# Patient Record
Sex: FEMALE | Race: WHITE | NOT HISPANIC OR LATINO | Employment: OTHER | URBAN - METROPOLITAN AREA
[De-identification: names, ages, dates, MRNs, and addresses within clinical notes are randomized per-mention and may not be internally consistent; named-entity substitution may affect disease eponyms.]

---

## 2017-05-05 ENCOUNTER — HOSPITAL ENCOUNTER (OUTPATIENT)
Dept: FAMILY MEDICINE CLINIC | Facility: CLINIC | Age: 62
Setting detail: SPECIMEN
Discharge: HOME | End: 2017-05-05
Attending: FAMILY MEDICINE | Admitting: FAMILY MEDICINE

## 2017-05-05 LAB
BACTERIA SPEC AEROBE CULT: NORMAL
Lab: NORMAL
MICRO REPORT STATUS: NORMAL
SPECIMEN SOURCE: NORMAL

## 2018-07-09 ENCOUNTER — HOSPITAL ENCOUNTER (OUTPATIENT)
Dept: FAMILY MEDICINE CLINIC | Facility: CLINIC | Age: 63
Setting detail: SPECIMEN
Discharge: HOME | End: 2018-07-09
Attending: FAMILY MEDICINE | Admitting: FAMILY MEDICINE

## 2018-07-09 LAB
ALBUMIN SERPL-MCNC: 3.6 G/DL (ref 3.5–4.8)
ALBUMIN/GLOB SERPL: 1.3 {RATIO} (ref 1–1.7)
ALP SERPL-CCNC: 93 IU/L (ref 32–91)
ALT SERPL-CCNC: 21 IU/L (ref 14–54)
ANION GAP SERPL CALC-SCNC: 10.4 MMOL/L (ref 10–20)
AST SERPL-CCNC: 23 IU/L (ref 15–41)
BILIRUB SERPL-MCNC: 0.5 MG/DL (ref 0.3–1.2)
BUN SERPL-MCNC: 14 MG/DL (ref 8–20)
BUN/CREAT SERPL: 15.6 (ref 5.4–26.2)
CALCIUM SERPL-MCNC: 9.3 MG/DL (ref 8.9–10.3)
CHLORIDE SERPL-SCNC: 108 MMOL/L (ref 101–111)
CHOLEST SERPL-MCNC: 145 MG/DL
CHOLEST/HDLC SERPL: 2.5 {RATIO}
CONV CO2: 26 MMOL/L (ref 22–32)
CONV LDL CHOLESTEROL DIRECT: 67 MG/DL (ref 0–100)
CONV TOTAL PROTEIN: 6.3 G/DL (ref 6.1–7.9)
CREAT UR-MCNC: 0.9 MG/DL (ref 0.4–1)
GLOBULIN UR ELPH-MCNC: 2.7 G/DL (ref 2.5–3.8)
GLUCOSE SERPL-MCNC: 90 MG/DL (ref 65–99)
HDLC SERPL-MCNC: 58 MG/DL
LDLC/HDLC SERPL: 1.2 {RATIO}
LIPID INTERPRETATION: NORMAL
POTASSIUM SERPL-SCNC: 4.4 MMOL/L (ref 3.6–5.1)
SODIUM SERPL-SCNC: 140 MMOL/L (ref 136–144)
TRIGL SERPL-MCNC: 104 MG/DL
VLDLC SERPL CALC-MCNC: 20.2 MG/DL

## 2019-07-15 ENCOUNTER — OFFICE VISIT (OUTPATIENT)
Dept: FAMILY MEDICINE CLINIC | Facility: CLINIC | Age: 64
End: 2019-07-15

## 2019-07-15 VITALS
TEMPERATURE: 98.6 F | DIASTOLIC BLOOD PRESSURE: 73 MMHG | SYSTOLIC BLOOD PRESSURE: 127 MMHG | HEART RATE: 69 BPM | OXYGEN SATURATION: 98 %

## 2019-07-15 DIAGNOSIS — D17.24 BENIGN LIPOMATOUS NEOPLASM OF SKIN AND SUBCUTANEOUS TISSUE OF LEFT LEG: Primary | ICD-10-CM

## 2019-07-15 PROBLEM — D72.810 LYMPHOCYTOPENIA: Status: ACTIVE | Noted: 2017-05-05

## 2019-07-15 PROCEDURE — 99213 OFFICE O/P EST LOW 20 MIN: CPT | Performed by: FAMILY MEDICINE

## 2019-07-15 RX ORDER — HYDROXYZINE 50 MG/1
50 TABLET, FILM COATED ORAL DAILY
COMMUNITY
Start: 2016-10-19 | End: 2020-10-27

## 2019-07-15 RX ORDER — BACLOFEN 20 MG/1
TABLET ORAL
COMMUNITY
Start: 2012-10-22 | End: 2020-10-27

## 2019-07-15 RX ORDER — NEBIVOLOL 20 MG/1
TABLET ORAL EVERY 24 HOURS
COMMUNITY
Start: 2017-12-14 | End: 2019-10-10 | Stop reason: SDUPTHER

## 2019-07-15 RX ORDER — ATORVASTATIN CALCIUM 20 MG/1
20 TABLET, FILM COATED ORAL DAILY
COMMUNITY
Start: 2016-04-18 | End: 2019-12-14 | Stop reason: SDUPTHER

## 2019-07-15 RX ORDER — CHOLECALCIFEROL (VITAMIN D3) 125 MCG
CAPSULE ORAL EVERY 24 HOURS
COMMUNITY
Start: 2018-07-09

## 2019-07-15 NOTE — ASSESSMENT & PLAN NOTE
No intervention needed at this time.  Continue observation.  If the area becomes painful or enlarged then refer to surgeon

## 2019-07-15 NOTE — PROGRESS NOTES
Subjective   Chief Complaint   Patient presents with   • Lower Extremity Issue     lump in left thigh x 1 month, no pain     Radha Ballard is a 64 y.o. female.     Noticed a lump in her left anterior thigh about a month ago.  No change since she originally noticed it.  Not painful.  No redness, no rash, no bruising.  No bleeding, no drainage.       Lower Extremity Issue   This is a new problem. The current episode started 1 to 4 weeks ago. The problem occurs constantly. Pertinent negatives include no abdominal pain, chest pain, chills, coughing, fever, rash, sore throat or swollen glands. Nothing aggravates the symptoms. She has tried nothing for the symptoms.      Past Medical History:   Diagnosis Date   • Encounter for Zostavax administration    • History of hepatitis A vaccination 2018   • History of lymphopenia     due ro Lemtrada   • Hypertension    • Meningitis 1969   • MS (mitral stenosis)     Dr Martinez, first symtoms at age 20   • Optic neuritis    • Sleep apnea 09/01/2016    Bi-pap machine   • Spinal stenosis      Past Surgical History:   Procedure Laterality Date   • BREAST BIOPSY Right 1998    benign   • CATARACT EXTRACTION, BILATERAL  03/2019   • HIP BIPOLAR REPLACEMENT Left 01/2014    Dr Stern   • LAMINOTOMY      L-3-L-4-L-5 Dr Menezes   • OTHER SURGICAL HISTORY      multiple SCLEROSIS plaque   • OTHER SURGICAL HISTORY      [facility], [place]   • SPINE SURGERY      minimally invasive spine surgery- Dr Torres     Allergies   Allergen Reactions   • Amlodipine Besylate Swelling   • Losartan Rash   • Lisinopril Cough     Social History     Socioeconomic History   • Marital status: Single     Spouse name: Not on file   • Number of children: Not on file   • Years of education: Not on file   • Highest education level: Not on file   Tobacco Use   • Smoking status: Light Tobacco Smoker     Years: 5.00     Types: Cigarettes   • Smokeless tobacco: Never Used   • Tobacco comment: quit at  age 28   Substance and Sexual Activity   • Alcohol use: Yes     Comment: socially     Social History     Tobacco Use   Smoking Status Light Tobacco Smoker   • Years: 5.00   • Types: Cigarettes   Smokeless Tobacco Never Used   Tobacco Comment    quit at age 28       family history includes Alzheimer's disease in her mother; COPD in her other; Cancer in her mother; Hypertension in her other; Other in her father.  Current Outpatient Medications on File Prior to Visit   Medication Sig Dispense Refill   • atorvastatin (LIPITOR) 20 MG tablet Take 20 mg by mouth Daily.     • baclofen (LIORESAL) 20 MG tablet TAKE 1 TABLET BY MOUTH FOUR TIMES DAILY     • Cholecalciferol (VITAMIN D3) 2000 units tablet Daily.     • hydrOXYzine (ATARAX) 50 MG tablet Take 50 mg by mouth Daily.     • nebivolol (BYSTOLIC) 20 MG tablet Daily.       No current facility-administered medications on file prior to visit.      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    Review of Systems   Constitutional: Negative for chills and fever.   HENT: Negative for sinus pressure and sore throat.    Eyes: Negative for blurred vision.   Respiratory: Negative for cough and shortness of breath.    Cardiovascular: Negative for chest pain and palpitations.   Gastrointestinal: Negative for abdominal pain.    Endocrine: Negative for polyuria.   Skin: Negative for rash.   Neurological: Negative for dizziness and headache.   Hematological: Negative for adenopathy.   Psychiatric/Behavioral: Negative for depressed mood.       Objective   /73 (BP Location: Left arm, Patient Position: Sitting, Cuff Size: Adult)   Pulse 69   Temp 98.6 °F (37 °C) (Oral)   SpO2 98%   Physical Exam   Constitutional: She appears well-developed and well-nourished.   In power chair   Cardiovascular: Normal rate.   Pulmonary/Chest: Effort normal.   Skin: Skin is warm and dry.   Left anterior proximal thigh with subcutaneous palpable mass approx 2cm.       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  Continue observation.  If the area becomes painful or enlarged then refer to surgeon                Radha was seen today for lower extremity issue.    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

## 2019-10-11 RX ORDER — NEBIVOLOL HYDROCHLORIDE 20 MG/1
TABLET ORAL
Qty: 90 TABLET | Refills: 3 | Status: SHIPPED | OUTPATIENT
Start: 2019-10-11 | End: 2020-07-28

## 2019-12-15 RX ORDER — ATORVASTATIN CALCIUM 20 MG/1
TABLET, FILM COATED ORAL
Qty: 90 TABLET | Refills: 0 | Status: SHIPPED | OUTPATIENT
Start: 2019-12-15 | End: 2020-02-12

## 2020-02-12 RX ORDER — ATORVASTATIN CALCIUM 20 MG/1
TABLET, FILM COATED ORAL
Qty: 90 TABLET | Refills: 3 | Status: SHIPPED | OUTPATIENT
Start: 2020-02-12 | End: 2020-11-20

## 2020-05-06 ENCOUNTER — TELEMEDICINE (OUTPATIENT)
Dept: FAMILY MEDICINE CLINIC | Facility: CLINIC | Age: 65
End: 2020-05-06

## 2020-05-06 DIAGNOSIS — M25.562 ACUTE PAIN OF LEFT KNEE: Primary | ICD-10-CM

## 2020-05-06 PROCEDURE — 99213 OFFICE O/P EST LOW 20 MIN: CPT | Performed by: FAMILY MEDICINE

## 2020-05-06 NOTE — PROGRESS NOTES
Subjective   Chief Complaint   Patient presents with   • Knee Pain     Radha Ballard is a 65 y.o. female.   You have chosen to receive care through a telehealth visit.  Do you consent to use a video/audio connection for your medical care today? Yes    Knee Pain    The incident occurred 5 to 7 days ago. There was no injury mechanism. The pain is present in the left knee. The quality of the pain is described as aching. The pain is moderate. The pain has been constant since onset. Associated symptoms include an inability to bear weight, a loss of motion and muscle weakness. She reports no foreign bodies present. The symptoms are aggravated by movement and weight bearing. She has tried nothing for the symptoms.      Past Medical History:   Diagnosis Date   • Encounter for Zostavax administration    • History of hepatitis A vaccination 2018   • History of lymphopenia     due ro Lemtrada   • Hypertension    • Meningitis 1969   • Multiple sclerosis (CMS/HCC)    • Neuromuscular disorder (CMS/HCC)    • Optic neuritis    • Sleep apnea 09/01/2016    Bi-pap machine   • Spinal stenosis      Past Surgical History:   Procedure Laterality Date   • BREAST BIOPSY Right 1998    benign   • CATARACT EXTRACTION, BILATERAL  03/2019   • HIP BIPOLAR REPLACEMENT Left 01/2014    Dr Stern   • LAMINOTOMY      L-3-L-4-L-5 Dr Menezes   • OTHER SURGICAL HISTORY      multiple SCLEROSIS plaque   • OTHER SURGICAL HISTORY      [facility]-2007 [facility], [place]   • SPINE SURGERY      minimally invasive spine surgery- Dr Torres     Allergies   Allergen Reactions   • Amlodipine Besylate Swelling   • Losartan Rash   • Lisinopril Cough     Social History     Socioeconomic History   • Marital status: Single     Spouse name: Not on file   • Number of children: Not on file   • Years of education: Not on file   • Highest education level: Not on file   Tobacco Use   • Smoking status: Light Tobacco Smoker     Years: 5.00     Types: Cigarettes   •  Smokeless tobacco: Never Used   • Tobacco comment: quit at age 28   Substance and Sexual Activity   • Alcohol use: Yes     Comment: socially   • Sexual activity: Not Currently     Social History     Tobacco Use   Smoking Status Light Tobacco Smoker   • Years: 5.00   • Types: Cigarettes   Smokeless Tobacco Never Used   Tobacco Comment    quit at age 28       family history includes Alzheimer's disease in her mother; COPD in an other family member; Cancer in her mother; Hypertension in an other family member; Other in her father.  Current Outpatient Medications on File Prior to Visit   Medication Sig Dispense Refill   • atorvastatin (LIPITOR) 20 MG tablet TAKE ONE TABLET BY MOUTH DAILY 90 tablet 3   • baclofen (LIORESAL) 20 MG tablet TAKE 1 TABLET BY MOUTH FOUR TIMES DAILY     • BYSTOLIC 20 MG tablet TAKE 1 TABLET DAILY 90 tablet 3   • Cholecalciferol (VITAMIN D3) 2000 units tablet Daily.     • hydrOXYzine (ATARAX) 50 MG tablet Take 50 mg by mouth Daily.       No current facility-administered medications on file prior to visit.      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   • Hip pain, right   • Hypertension   • Lymphocytopenia   • Multiple sclerosis (CMS/HCC)   • Neuritis   • Osteoarthritis of right hip   • Atopic dermatitis   • Other screening mammogram   • Postmenopausal status   • Scoliosis of lumbar spine   • Encounter for general adult medical examination without abnormal findings   • Status post right hip replacement   • Acute pain of left knee       The following portions of the patient's history were reviewed and updated as appropriate: allergies, current medications, past family history, past medical history, past social history, past surgical history and problem list.    Review of Systems   Constitutional: Negative for chills and fever.   HENT: Negative for sinus pressure and sore throat.    Eyes: Negative for blurred vision.    Respiratory: Negative for cough and shortness of breath.    Cardiovascular: Negative for chest pain and palpitations.   Gastrointestinal: Negative for abdominal pain.   Endocrine: Negative for polyuria.   Musculoskeletal: Positive for arthralgias and joint swelling.   Skin: Negative for rash.   Neurological: Positive for weakness. Negative for dizziness and headache.   Hematological: Negative for adenopathy.   Psychiatric/Behavioral: Negative for depressed mood.       Objective   There were no vitals taken for this visit.  Physical Exam   Constitutional: She appears well-developed. No distress.   HENT:   Head: Normocephalic.   Eyes: EOM are normal.   Neck: Normal range of motion.   Pulmonary/Chest: Effort normal.   Musculoskeletal:        Left knee: She exhibits decreased range of motion and swelling. She exhibits no erythema.   Neurological: She is alert.   Psychiatric: She has a normal mood and affect. Her behavior is normal.       No visits with results within 1 Week(s) from this visit.   Latest known visit with results is:   Abstract on 07/11/2019   Component Date Value Ref Range Status   •  Mammogram 06/24/2018 see report    Final   •  Dexa Scan 07/26/2018 see report    Final           Assessment/Plan   Problems Addressed this Visit        Musculoskeletal and Integument    Acute pain of left knee - Primary     Rest, ice, elevate, ibuprofen otc prn.         Relevant Orders    MRI Knee Left Without Contrast            Total time spent in evaluation of patient:  15 min

## 2020-05-14 DIAGNOSIS — M25.562 ACUTE PAIN OF LEFT KNEE: ICD-10-CM

## 2020-07-28 RX ORDER — NEBIVOLOL HYDROCHLORIDE 20 MG/1
TABLET ORAL
Qty: 90 TABLET | Refills: 3 | Status: SHIPPED | OUTPATIENT
Start: 2020-07-28 | End: 2021-05-18

## 2020-10-27 ENCOUNTER — LAB (OUTPATIENT)
Dept: FAMILY MEDICINE CLINIC | Facility: CLINIC | Age: 65
End: 2020-10-27

## 2020-10-27 ENCOUNTER — OFFICE VISIT (OUTPATIENT)
Dept: FAMILY MEDICINE CLINIC | Facility: CLINIC | Age: 65
End: 2020-10-27

## 2020-10-27 VITALS
TEMPERATURE: 96.6 F | SYSTOLIC BLOOD PRESSURE: 117 MMHG | OXYGEN SATURATION: 98 % | HEART RATE: 61 BPM | DIASTOLIC BLOOD PRESSURE: 75 MMHG

## 2020-10-27 DIAGNOSIS — Z78.0 POSTMENOPAUSAL: ICD-10-CM

## 2020-10-27 DIAGNOSIS — E78.5 HYPERLIPIDEMIA, UNSPECIFIED HYPERLIPIDEMIA TYPE: ICD-10-CM

## 2020-10-27 DIAGNOSIS — Z00.00 ENCOUNTER FOR GENERAL ADULT MEDICAL EXAMINATION WITHOUT ABNORMAL FINDINGS: Primary | ICD-10-CM

## 2020-10-27 DIAGNOSIS — Z13.1 SCREENING FOR DIABETES MELLITUS: ICD-10-CM

## 2020-10-27 DIAGNOSIS — Z13.220 SCREENING, LIPID: ICD-10-CM

## 2020-10-27 DIAGNOSIS — Z12.31 ENCOUNTER FOR SCREENING MAMMOGRAM FOR BREAST CANCER: ICD-10-CM

## 2020-10-27 LAB
ALBUMIN SERPL-MCNC: 4.2 G/DL (ref 3.5–5.2)
ALBUMIN/GLOB SERPL: 1.5 G/DL
ALP SERPL-CCNC: 101 U/L (ref 39–117)
ALT SERPL W P-5'-P-CCNC: 18 U/L (ref 1–33)
ANION GAP SERPL CALCULATED.3IONS-SCNC: 10.5 MMOL/L (ref 5–15)
AST SERPL-CCNC: 19 U/L (ref 1–32)
BILIRUB SERPL-MCNC: 0.4 MG/DL (ref 0–1.2)
BUN SERPL-MCNC: 14 MG/DL (ref 8–23)
BUN/CREAT SERPL: 16.5 (ref 7–25)
CALCIUM SPEC-SCNC: 9.4 MG/DL (ref 8.6–10.5)
CHLORIDE SERPL-SCNC: 106 MMOL/L (ref 98–107)
CHOLEST SERPL-MCNC: 168 MG/DL (ref 0–200)
CO2 SERPL-SCNC: 27.5 MMOL/L (ref 22–29)
CREAT SERPL-MCNC: 0.85 MG/DL (ref 0.57–1)
GFR SERPL CREATININE-BSD FRML MDRD: 67 ML/MIN/1.73
GLOBULIN UR ELPH-MCNC: 2.8 GM/DL
GLUCOSE SERPL-MCNC: 96 MG/DL (ref 65–99)
HDLC SERPL-MCNC: 78 MG/DL (ref 40–60)
LDLC SERPL CALC-MCNC: 70 MG/DL (ref 0–100)
LDLC/HDLC SERPL: 0.86 {RATIO}
POTASSIUM SERPL-SCNC: 4.8 MMOL/L (ref 3.5–5.2)
PROT SERPL-MCNC: 7 G/DL (ref 6–8.5)
SODIUM SERPL-SCNC: 144 MMOL/L (ref 136–145)
TRIGL SERPL-MCNC: 116 MG/DL (ref 0–150)
VLDLC SERPL-MCNC: 20 MG/DL (ref 5–40)

## 2020-10-27 PROCEDURE — 80061 LIPID PANEL: CPT | Performed by: FAMILY MEDICINE

## 2020-10-27 PROCEDURE — 80053 COMPREHEN METABOLIC PANEL: CPT | Performed by: FAMILY MEDICINE

## 2020-10-27 PROCEDURE — G0439 PPPS, SUBSEQ VISIT: HCPCS | Performed by: FAMILY MEDICINE

## 2020-10-27 PROCEDURE — 36415 COLL VENOUS BLD VENIPUNCTURE: CPT | Performed by: FAMILY MEDICINE

## 2020-10-27 RX ORDER — MELOXICAM 15 MG/1
15 TABLET ORAL NIGHTLY
COMMUNITY
Start: 2020-08-31 | End: 2020-11-22 | Stop reason: HOSPADM

## 2020-10-27 RX ORDER — NYSTATIN 100000 U/G
OINTMENT TOPICAL 2 TIMES DAILY
Qty: 30 G | Refills: 1 | Status: SHIPPED | OUTPATIENT
Start: 2020-10-27 | End: 2020-11-20

## 2020-11-20 ENCOUNTER — APPOINTMENT (OUTPATIENT)
Dept: GENERAL RADIOLOGY | Facility: HOSPITAL | Age: 65
End: 2020-11-20

## 2020-11-20 ENCOUNTER — HOSPITAL ENCOUNTER (OUTPATIENT)
Facility: HOSPITAL | Age: 65
Setting detail: OBSERVATION
Discharge: HOME | End: 2020-11-22
Attending: HOSPITALIST | Admitting: HOSPITALIST

## 2020-11-20 ENCOUNTER — APPOINTMENT (OUTPATIENT)
Dept: MRI IMAGING | Facility: HOSPITAL | Age: 65
End: 2020-11-20

## 2020-11-20 DIAGNOSIS — R41.0 CONFUSION: ICD-10-CM

## 2020-11-20 DIAGNOSIS — N30.01 ACUTE CYSTITIS WITH HEMATURIA: Primary | ICD-10-CM

## 2020-11-20 PROBLEM — G93.41 ACUTE METABOLIC ENCEPHALOPATHY: Status: ACTIVE | Noted: 2020-11-20

## 2020-11-20 LAB
ALBUMIN SERPL-MCNC: 4.1 G/DL (ref 3.5–5.2)
ALBUMIN/GLOB SERPL: 1.6 G/DL
ALP SERPL-CCNC: 99 U/L (ref 39–117)
ALT SERPL W P-5'-P-CCNC: 16 U/L (ref 1–33)
AMORPH URATE CRY URNS QL MICRO: ABNORMAL /HPF
ANION GAP SERPL CALCULATED.3IONS-SCNC: 9 MMOL/L (ref 5–15)
AST SERPL-CCNC: 17 U/L (ref 1–32)
BACTERIA UR QL AUTO: ABNORMAL /HPF
BASOPHILS # BLD AUTO: 0 10*3/MM3 (ref 0–0.2)
BASOPHILS NFR BLD AUTO: 0.4 % (ref 0–1.5)
BILIRUB SERPL-MCNC: 0.3 MG/DL (ref 0–1.2)
BILIRUB UR QL STRIP: NEGATIVE
BUN SERPL-MCNC: 15 MG/DL (ref 8–23)
BUN/CREAT SERPL: 19.2 (ref 7–25)
CALCIUM SPEC-SCNC: 9.5 MG/DL (ref 8.6–10.5)
CHLORIDE SERPL-SCNC: 106 MMOL/L (ref 98–107)
CLARITY UR: ABNORMAL
CO2 SERPL-SCNC: 27 MMOL/L (ref 22–29)
COLOR UR: YELLOW
CREAT SERPL-MCNC: 0.78 MG/DL (ref 0.57–1)
DEPRECATED RDW RBC AUTO: 43.3 FL (ref 37–54)
EOSINOPHIL # BLD AUTO: 0 10*3/MM3 (ref 0–0.4)
EOSINOPHIL NFR BLD AUTO: 0.5 % (ref 0.3–6.2)
ERYTHROCYTE [DISTWIDTH] IN BLOOD BY AUTOMATED COUNT: 13.6 % (ref 12.3–15.4)
GFR SERPL CREATININE-BSD FRML MDRD: 74 ML/MIN/1.73
GLOBULIN UR ELPH-MCNC: 2.5 GM/DL
GLUCOSE SERPL-MCNC: 118 MG/DL (ref 65–99)
GLUCOSE UR STRIP-MCNC: NEGATIVE MG/DL
GRAN CASTS URNS QL MICRO: ABNORMAL /LPF
HCT VFR BLD AUTO: 42.8 % (ref 34–46.6)
HGB BLD-MCNC: 14.3 G/DL (ref 12–15.9)
HGB UR QL STRIP.AUTO: NEGATIVE
HOLD SPECIMEN: NORMAL
HYALINE CASTS UR QL AUTO: ABNORMAL /LPF
KETONES UR QL STRIP: ABNORMAL
LEUKOCYTE ESTERASE UR QL STRIP.AUTO: ABNORMAL
LIPASE SERPL-CCNC: 21 U/L (ref 13–60)
LYMPHOCYTES # BLD AUTO: 0.9 10*3/MM3 (ref 0.7–3.1)
LYMPHOCYTES NFR BLD AUTO: 10.3 % (ref 19.6–45.3)
MCH RBC QN AUTO: 29.9 PG (ref 26.6–33)
MCHC RBC AUTO-ENTMCNC: 33.3 G/DL (ref 31.5–35.7)
MCV RBC AUTO: 89.6 FL (ref 79–97)
MONOCYTES # BLD AUTO: 0.4 10*3/MM3 (ref 0.1–0.9)
MONOCYTES NFR BLD AUTO: 4.1 % (ref 5–12)
NEUTROPHILS NFR BLD AUTO: 7.6 10*3/MM3 (ref 1.7–7)
NEUTROPHILS NFR BLD AUTO: 84.7 % (ref 42.7–76)
NITRITE UR QL STRIP: NEGATIVE
NRBC BLD AUTO-RTO: 0 /100 WBC (ref 0–0.2)
PH UR STRIP.AUTO: 8 [PH] (ref 5–8)
PLATELET # BLD AUTO: 225 10*3/MM3 (ref 140–450)
PMV BLD AUTO: 8.3 FL (ref 6–12)
POTASSIUM SERPL-SCNC: 4.2 MMOL/L (ref 3.5–5.2)
PROT SERPL-MCNC: 6.6 G/DL (ref 6–8.5)
PROT UR QL STRIP: NEGATIVE
RBC # BLD AUTO: 4.78 10*6/MM3 (ref 3.77–5.28)
RBC # UR: ABNORMAL /HPF
REF LAB TEST METHOD: ABNORMAL
SARS-COV-2 RNA PNL SPEC NAA+PROBE: NOT DETECTED
SODIUM SERPL-SCNC: 142 MMOL/L (ref 136–145)
SP GR UR STRIP: 1.01 (ref 1–1.03)
SQUAMOUS #/AREA URNS HPF: ABNORMAL /HPF
TROPONIN T SERPL-MCNC: <0.01 NG/ML (ref 0–0.03)
UROBILINOGEN UR QL STRIP: ABNORMAL
WBC # BLD AUTO: 9 10*3/MM3 (ref 3.4–10.8)
WBC UR QL AUTO: ABNORMAL /HPF

## 2020-11-20 PROCEDURE — G0378 HOSPITAL OBSERVATION PER HR: HCPCS

## 2020-11-20 PROCEDURE — 99284 EMERGENCY DEPT VISIT MOD MDM: CPT

## 2020-11-20 PROCEDURE — 81001 URINALYSIS AUTO W/SCOPE: CPT | Performed by: PHYSICIAN ASSISTANT

## 2020-11-20 PROCEDURE — 99219 PR INITIAL OBSERVATION CARE/DAY 50 MINUTES: CPT | Performed by: HOSPITALIST

## 2020-11-20 PROCEDURE — P9612 CATHETERIZE FOR URINE SPEC: HCPCS

## 2020-11-20 PROCEDURE — 84484 ASSAY OF TROPONIN QUANT: CPT | Performed by: PHYSICIAN ASSISTANT

## 2020-11-20 PROCEDURE — 70551 MRI BRAIN STEM W/O DYE: CPT

## 2020-11-20 PROCEDURE — 71045 X-RAY EXAM CHEST 1 VIEW: CPT

## 2020-11-20 PROCEDURE — 87635 SARS-COV-2 COVID-19 AMP PRB: CPT | Performed by: PHYSICIAN ASSISTANT

## 2020-11-20 PROCEDURE — C9803 HOPD COVID-19 SPEC COLLECT: HCPCS

## 2020-11-20 PROCEDURE — 80053 COMPREHEN METABOLIC PANEL: CPT | Performed by: PHYSICIAN ASSISTANT

## 2020-11-20 PROCEDURE — 85025 COMPLETE CBC W/AUTO DIFF WBC: CPT | Performed by: PHYSICIAN ASSISTANT

## 2020-11-20 PROCEDURE — 93005 ELECTROCARDIOGRAM TRACING: CPT | Performed by: PHYSICIAN ASSISTANT

## 2020-11-20 PROCEDURE — 25010000002 CEFTRIAXONE PER 250 MG: Performed by: PHYSICIAN ASSISTANT

## 2020-11-20 PROCEDURE — 87086 URINE CULTURE/COLONY COUNT: CPT | Performed by: PHYSICIAN ASSISTANT

## 2020-11-20 PROCEDURE — 83690 ASSAY OF LIPASE: CPT | Performed by: PHYSICIAN ASSISTANT

## 2020-11-20 PROCEDURE — 96374 THER/PROPH/DIAG INJ IV PUSH: CPT

## 2020-11-20 RX ORDER — ACETAMINOPHEN 160 MG/5ML
650 SOLUTION ORAL EVERY 4 HOURS PRN
Status: DISCONTINUED | OUTPATIENT
Start: 2020-11-20 | End: 2020-11-22 | Stop reason: HOSPADM

## 2020-11-20 RX ORDER — MULTIPLE VITAMINS W/ MINERALS TAB 9MG-400MCG
1 TAB ORAL NIGHTLY
Status: DISCONTINUED | OUTPATIENT
Start: 2020-11-20 | End: 2020-11-22 | Stop reason: HOSPADM

## 2020-11-20 RX ORDER — ONDANSETRON 2 MG/ML
4 INJECTION INTRAMUSCULAR; INTRAVENOUS EVERY 6 HOURS PRN
Status: DISCONTINUED | OUTPATIENT
Start: 2020-11-20 | End: 2020-11-22 | Stop reason: HOSPADM

## 2020-11-20 RX ORDER — SODIUM CHLORIDE 0.9 % (FLUSH) 0.9 %
10 SYRINGE (ML) INJECTION EVERY 12 HOURS SCHEDULED
Status: DISCONTINUED | OUTPATIENT
Start: 2020-11-20 | End: 2020-11-22 | Stop reason: HOSPADM

## 2020-11-20 RX ORDER — ATORVASTATIN CALCIUM 20 MG/1
20 TABLET, FILM COATED ORAL NIGHTLY
Status: DISCONTINUED | OUTPATIENT
Start: 2020-11-20 | End: 2020-11-22 | Stop reason: HOSPADM

## 2020-11-20 RX ORDER — ATORVASTATIN CALCIUM 20 MG/1
TABLET, FILM COATED ORAL
Qty: 90 TABLET | Refills: 3 | Status: SHIPPED | OUTPATIENT
Start: 2020-11-20 | End: 2021-11-15

## 2020-11-20 RX ORDER — ONDANSETRON 4 MG/1
4 TABLET, FILM COATED ORAL EVERY 6 HOURS PRN
Status: DISCONTINUED | OUTPATIENT
Start: 2020-11-20 | End: 2020-11-22 | Stop reason: HOSPADM

## 2020-11-20 RX ORDER — SODIUM CHLORIDE 0.9 % (FLUSH) 0.9 %
10 SYRINGE (ML) INJECTION AS NEEDED
Status: DISCONTINUED | OUTPATIENT
Start: 2020-11-20 | End: 2020-11-22 | Stop reason: HOSPADM

## 2020-11-20 RX ORDER — BISACODYL 10 MG
10 SUPPOSITORY, RECTAL RECTAL DAILY PRN
Status: DISCONTINUED | OUTPATIENT
Start: 2020-11-20 | End: 2020-11-22 | Stop reason: HOSPADM

## 2020-11-20 RX ORDER — ACETAMINOPHEN 325 MG/1
650 TABLET ORAL EVERY 4 HOURS PRN
Status: DISCONTINUED | OUTPATIENT
Start: 2020-11-20 | End: 2020-11-22 | Stop reason: HOSPADM

## 2020-11-20 RX ORDER — ACETAMINOPHEN 650 MG/1
650 SUPPOSITORY RECTAL EVERY 4 HOURS PRN
Status: DISCONTINUED | OUTPATIENT
Start: 2020-11-20 | End: 2020-11-22 | Stop reason: HOSPADM

## 2020-11-20 RX ORDER — BISACODYL 5 MG/1
5 TABLET, DELAYED RELEASE ORAL DAILY PRN
Status: DISCONTINUED | OUTPATIENT
Start: 2020-11-20 | End: 2020-11-22 | Stop reason: HOSPADM

## 2020-11-20 RX ORDER — ALUMINA, MAGNESIA, AND SIMETHICONE 2400; 2400; 240 MG/30ML; MG/30ML; MG/30ML
15 SUSPENSION ORAL EVERY 6 HOURS PRN
Status: DISCONTINUED | OUTPATIENT
Start: 2020-11-20 | End: 2020-11-22 | Stop reason: HOSPADM

## 2020-11-20 RX ORDER — NEBIVOLOL 10 MG/1
20 TABLET ORAL DAILY
Status: DISCONTINUED | OUTPATIENT
Start: 2020-11-20 | End: 2020-11-22 | Stop reason: HOSPADM

## 2020-11-20 RX ORDER — CHOLECALCIFEROL (VITAMIN D3) 125 MCG
5 CAPSULE ORAL NIGHTLY PRN
Status: DISCONTINUED | OUTPATIENT
Start: 2020-11-20 | End: 2020-11-22 | Stop reason: HOSPADM

## 2020-11-20 RX ORDER — MELOXICAM 15 MG/1
15 TABLET ORAL NIGHTLY
Status: DISCONTINUED | OUTPATIENT
Start: 2020-11-20 | End: 2020-11-22 | Stop reason: HOSPADM

## 2020-11-20 RX ADMIN — NEBIVOLOL HYDROCHLORIDE 20 MG: 10 TABLET ORAL at 16:42

## 2020-11-20 RX ADMIN — SODIUM CHLORIDE 1000 ML: 0.9 INJECTION, SOLUTION INTRAVENOUS at 10:09

## 2020-11-20 RX ADMIN — CEFTRIAXONE SODIUM 1 G: 10 INJECTION, POWDER, FOR SOLUTION INTRAVENOUS at 12:43

## 2020-11-20 RX ADMIN — Medication 5000 UNITS: at 16:42

## 2020-11-20 RX ADMIN — Medication 10 ML: at 20:37

## 2020-11-20 RX ADMIN — ATORVASTATIN CALCIUM 20 MG: 20 TABLET, FILM COATED ORAL at 20:37

## 2020-11-20 RX ADMIN — MELOXICAM 15 MG: 15 TABLET ORAL at 20:37

## 2020-11-20 RX ADMIN — MULTIPLE VITAMINS W/ MINERALS TAB 1 TABLET: TAB at 20:37

## 2020-11-20 NOTE — ED NOTES
Notified Yajaira in MRI that patient is ready.  They will call us when they have a able free.     Jodi Najera, RegSched Rep  11/20/20 9791

## 2020-11-20 NOTE — H&P
[facility] Medicine Services      Patient Name: Radha Ballard  : 1955  MRN: [id number]  Primary Care Physician: Gretta Canada MD  Date of admission: 2020    Patient Care Team:  Gretta Canada MD as PCP - General (Family Medicine)  Torres Martinez MD as Consulting Physician (Neurology)          Subjective   History Present Illness     Chief Complaint:   Chief Complaint   Patient presents with   • Dizziness         Ms. Ballard is a 65 y.o. female with PMH of MS, HTN, HLD, KEYUR who presented to [facility] ER 20 with complaints of confusion. Her wife stated she was confused last night and having word finding difficulties. She had no headache, no lateralizing weakness, no dizziness. She denied any fever or recent illness. She has had some dysuria. She is prone to UTIs.     In the ER the patient had MRI brain that showed no acute ischemia or hemorrhage.  Moderate areas of predominantly periventricular white matter signal abnormality statistically like related to chronic microvascular disease however demyelinating disease is also possible given the configuration.  Generalized cerebral atrophy.  Masslike meningioma abutting the parasagittal left frontal lobe as previously seen. CXR negative.  Urinalysis showed large leukocytes, too numerous WBCs, 2+ bacteria.  Covid negative. She was started on IV Rocephin and admitted for further evaluation of UTI and confusion.        Review of Systems   Constitution: Negative.   HENT: Negative.    Eyes: Negative.    Cardiovascular: Negative.    Respiratory: Negative.    Endocrine: Negative.    Hematologic/Lymphatic: Negative.    Skin: Negative.    Musculoskeletal: Negative.    Gastrointestinal: Negative.    Genitourinary: Positive for dysuria and frequency.   Neurological: Negative.    Psychiatric/Behavioral: Positive for altered mental status.   Allergic/Immunologic: Negative.    All other systems reviewed and are  negative.          Personal History     Past Medical History:   Past Medical History:   Diagnosis Date   • Elevated cholesterol    • Encounter for Zostavax administration    • History of hepatitis A vaccination 2018   • History of lymphopenia     due ro Lemtrada   • Hypertension    • Meningitis 1969   • Multiple sclerosis (CMS/HCC)    • Neuromuscular disorder (CMS/HCC)    • Optic neuritis    • Sleep apnea 09/01/2016    Bi-pap machine   • Spinal stenosis        Surgical History:      Past Surgical History:   Procedure Laterality Date   • BREAST BIOPSY Right 1998    benign   • CATARACT EXTRACTION, BILATERAL  03/2019   • HIP BIPOLAR REPLACEMENT Left 01/2014    Dr Stern   • LAMINOTOMY      L-3-L-4-L-5 Dr Menezes   • OTHER SURGICAL HISTORY      multiple SCLEROSIS plaque   • OTHER SURGICAL HISTORY      [facility]-2007 [facility], [place]   • SPINE SURGERY      minimally invasive spine surgery- Dr Torres           Family History: family history includes Alzheimer's disease in her mother; COPD in an other family member; Cancer in her mother; Hypertension in an other family member; Other in her father. Otherwise pertinent FHx was reviewed and unremarkable.     Social History:  reports that she has quit smoking. Her smoking use included cigarettes. She quit after 5.00 years of use. She has never used smokeless tobacco. She reports current alcohol use.      Medications:  Prior to Admission medications    Medication Sig Start Date End Date Taking? Authorizing Provider   atorvastatin (LIPITOR) 20 MG tablet TAKE 1 TABLET BY MOUTH DAILY 11/20/20  Yes Gretta Canada MD   BYSTOLIC 20 MG tablet TAKE 1 TABLET DAILY 7/28/20  Yes Gretta Canada MD   Cholecalciferol (VITAMIN D3) 2000 units tablet Daily. 7/9/18  Yes ProviderSunny MD   meloxicam (MOBIC) 15 MG tablet Take 15 mg by mouth Every Night. 8/31/20  Yes Sunny Etienne MD   Multiple Vitamins-Minerals (MULTIVITAMIN WOMEN 50+ PO) Take 1 tablet by mouth  Every Night.   Yes Provider, MD uSnny   atorvastatin (LIPITOR) 20 MG tablet TAKE ONE TABLET BY MOUTH DAILY 2/12/20 11/20/20  Gretta Canada MD   nystatin (MYCOSTATIN) 487099 UNIT/GM ointment Apply  topically to the appropriate area as directed 2 (Two) Times a Day. 10/27/20 11/20/20  Gretta Canada MD       Allergies:    Allergies   Allergen Reactions   • Amlodipine Besylate Swelling   • Losartan Rash   • Lisinopril Cough       Objective   Objective     Vital Signs  Temp:  [98.4 °F (36.9 °C)-98.8 °F (37.1 °C)] 98.8 °F (37.1 °C)  Heart Rate:  [50-60] 60  Resp:  [14-18] 18  BP: (133-166)/(54-81) 133/81  SpO2:  [99 %-100 %] 100 %  on   ;   Device (Oxygen Therapy): room air  Body mass index is 32.65 kg/m².    Physical Exam  Vitals signs and nursing note reviewed.   Constitutional:       Appearance: Normal appearance. She is well-developed.   HENT:      Head: Normocephalic and atraumatic.   Eyes:      Extraocular Movements: Extraocular movements intact.      Conjunctiva/sclera: Conjunctivae normal.      Pupils: Pupils are equal, round, and reactive to light.   Neck:      Musculoskeletal: Normal range of motion and neck supple.      Thyroid: No thyromegaly.      Vascular: No JVD.      Trachea: No tracheal deviation.   Cardiovascular:      Rate and Rhythm: Normal rate and regular rhythm.      Pulses: Normal pulses.      Heart sounds: Normal heart sounds.   Pulmonary:      Effort: Pulmonary effort is normal. No respiratory distress.      Breath sounds: Normal breath sounds. No wheezing, rhonchi or rales.   Abdominal:      General: Bowel sounds are normal.      Palpations: Abdomen is soft.      Tenderness: There is no abdominal tenderness.   Musculoskeletal: Normal range of motion.   Skin:     General: Skin is warm and dry.   Neurological:      General: No focal deficit present.      Mental Status: She is alert and oriented to person, place, and time. Mental status is at baseline.      Motor: No abnormal muscle  tone.   Psychiatric:         Mood and Affect: Mood normal.         Behavior: Behavior normal.         Thought Content: Thought content normal.         Judgment: Judgment normal.         Results Review:  I have personally reviewed most recent lab results and radiology images and interpretations and agree with findings,    Results from last 7 days   Lab Units 11/20/20  1009   WBC 10*3/mm3 9.00   HEMOGLOBIN g/dL 14.3   HEMATOCRIT % 42.8   PLATELETS 10*3/mm3 225     Results from last 7 days   Lab Units 11/20/20  1009   SODIUM mmol/L 142   POTASSIUM mmol/L 4.2   CHLORIDE mmol/L 106   CO2 mmol/L 27.0   BUN mg/dL 15   CREATININE mg/dL 0.78   GLUCOSE mg/dL 118*   CALCIUM mg/dL 9.5   ALT (SGPT) U/L 16   AST (SGOT) U/L 17   TROPONIN T ng/mL <0.010     Estimated Creatinine Clearance: 83.2 mL/min (by C-G formula based on SCr of 0.78 mg/dL).  Brief Urine Lab Results  (Last result in the past 365 days)      Color   Clarity   Blood   Leuk Est   Nitrite   Protein   CREAT   Urine HCG        11/20/20 1009 Yellow Cloudy Negative Large (3+) Negative Negative               Microbiology Results (last 10 days)     Procedure Component Value - Date/Time    COVID-19, ABBOTT IN-HOUSE,NP Swab (NO TRANSPORT MEDIA) 2 HR TAT - Swab, Nasopharynx [[id number]]  (Normal) Collected: 11/20/20 1009    Lab Status: Final result Specimen: Swab from Nasopharynx Updated: 11/20/20 1030     COVID19 Not Detected    Narrative:      Fact sheet for providers: https://www.fda.gov/media/234355/download     Fact sheet for patients: https://www.fda.gov/media/715098/download          ECG/EMG Results (most recent)     Procedure Component Value Units Date/Time    ECG 12 Lead [[id number]] Collected: 11/20/20 0955     Updated: 11/20/20 0957     QT Interval 449 ms     Narrative:      HEART RATE= 53  bpm  RR Interval= 1124  ms  TX Interval= 172  ms  P Horizontal Axis= 15  deg  P Front Axis= 83  deg  QRSD Interval= 98  ms  QT Interval= 449  ms  QRS Axis= 9  deg  T Wave Axis= 13   deg  - OTHERWISE NORMAL ECG -  Sinus bradycardia  Low voltage, precordial leads  Electronically Signed By:   Date and Time of Study: 2020-11-20 09:55:20                    Mri Brain Without Contrast    Result Date: 11/20/2020  1. Negative for acute ischemia or hemorrhage. 2. Moderate areas of predominantly periventricular white matter signal abnormality statistically likely related to chronic microvascular disease however demyelinating disease is also possible given the configuration. 3. Generalized cerebral atrophy. 4. Extra-axial dural based 19 x 11 mm previously seen calcified mass likely meningioma abutting the parasagittal left frontal lobe.  Electronically Signed By-Mark Epps MD On:11/20/2020 11:31 AM This report was finalized on [id number] by  Mark Epps MD.    Xr Chest 1 View    Result Date: 11/20/2020  No acute cardiopulmonary abnormality is identified.  Electronically Signed By-Becky Fernandez MD On:11/20/2020 9:55 AM This report was finalized on [id number] by  Becky Fernandez MD.        Estimated Creatinine Clearance: 83.2 mL/min (by C-G formula based on SCr of 0.78 mg/dL).    Assessment/Plan   Assessment/Plan       Active Hospital Problems    Diagnosis  POA   • **Acute cystitis with hematuria [N30.01]  Yes     Priority: High   • Acute metabolic encephalopathy [G93.41]  Yes     Priority: High   • Elevated cholesterol [E78.00]  Yes   • Hypertension [I10]  Yes   • Multiple sclerosis (CMS/HCC) [G35]  Yes      Resolved Hospital Problems   No resolved problems to display.       Acute cystitis  -UA: large leukocytes, too numerous WBCs, 2+ bacteria.  -follow urine culture  -IV rocephin    Acute metabolic encephalopathy  -secondary to above  -MRI brain: no acute ischemia or hemorrhage.  Moderate areas of predominantly periventricular white matter signal abnormality statistically like related to chronic microvascular disease however demyelinating disease is also possible given the configuration.   Generalized cerebral atrophy.  Masslike meningioma abutting the parasagittal left frontal lobe as previously seen.  - resolved     Multiple sclerosis   -wheelchair bound  -cont home mobic    Hypertension  -cont home bystolic    Hyperlipidemia  -cont home statin    KEYUR  -cpap qhs prn      VTE Prophylaxis - SCDs      CODE STATUS:    Code Status and Medical Interventions:   Ordered at: 11/20/20 1525     Level Of Support Discussed With:    Patient     Code Status:    CPR     Medical Interventions (Level of Support Prior to Arrest):    Full       This patient has been examined wearing appropriate Personal Protective Equipment 11/20/20      I discussed the patient's findings and my recommendations with patient.      Signature: Electronically signed by CANDIDA Huber, 11/20/20, 4:59 PM EST.    [facility] Hospitalist Team    Hospitalist/attending note  Patient seen and examined, chart reviewed.  Agree with above.  Patient coming in with confusion, work-up revealed findings concerning for UTI, cultures awaited.  Patient underwent MRI head no acute process.  Vitals reviewed  Chest, bilateral entry, normal vesicular breathing  Cardiovascular, S1-S2 there is no murmur  Abdomen soft nontender good sounds audible.  CNS grossly intact, there is no focal neurologic deficit present    Impression  UTI  Metabolic encephalopathy improved  Multiple sclerosis  Hypertension    Plan  Agree with above.  Monitor WBC count  Continue IV antibiotics  Follow urine cultures    Cassandra Mcgee MD.

## 2020-11-20 NOTE — ED PROVIDER NOTES
"Subjective   History: Patient is a pleasant 65-year-old female who presents to the ER with complaints that yesterday and this morning she was acting \"confused\".  Reports that she was inserting words into her sentences that did not make sense although her speech was not garbled.  She also reports that she had diarrhea and felt generalized muscle ache and fatigue over the last 24 hours.  Her family member at bedside confirms the story and reports that she had a similar episode when she was very dehydrated.  Patient self does not remember being confused or acting abnormal.  Denies any fevers chills cough abdominal pain      Onset: 1 day  Location: Generalized  Duration: Intermittent  Character: Confusion fatigue  Aggravating/Alleviating factors: None   radiation none  Severity: Moderate            Review of Systems   Constitutional: Positive for fatigue. Negative for chills, diaphoresis and fever.   Respiratory: Negative for cough and shortness of breath.    Cardiovascular: Negative for chest pain.   Gastrointestinal: Positive for diarrhea. Negative for abdominal pain, nausea and vomiting.   Genitourinary: Negative.    Musculoskeletal: Negative.    Skin: Negative.    Neurological: Positive for weakness. Negative for dizziness and light-headedness.   Psychiatric/Behavioral: Positive for confusion.       Past Medical History:   Diagnosis Date   • Encounter for Zostavax administration    • History of hepatitis A vaccination 2018   • History of lymphopenia     due ro Lemtrada   • Hypertension    • Meningitis 1969   • Multiple sclerosis (CMS/HCC)    • Neuromuscular disorder (CMS/HCC)    • Optic neuritis    • Sleep apnea 09/01/2016    Bi-pap machine   • Spinal stenosis        Allergies   Allergen Reactions   • Amlodipine Besylate Swelling   • Losartan Rash   • Lisinopril Cough       Past Surgical History:   Procedure Laterality Date   • BREAST BIOPSY Right 1998    benign   • CATARACT EXTRACTION, BILATERAL  03/2019   • HIP " BIPOLAR REPLACEMENT Left 01/2014    Dr Stern   • LAMINOTOMY      L-3-L-4-L-5 Dr Menezes   • OTHER SURGICAL HISTORY      multiple SCLEROSIS plaque   • OTHER SURGICAL HISTORY      CR[facility]AL MEMINGROMA-2007 [facility] [place]   • SPINE SURGERY      minimally invasive spine surgery- Dr Torres       Family History   Problem Relation Age of Onset   • Cancer Mother         breast   • Alzheimer's disease Mother    • Other Father    • Hypertension Other    • COPD Other        Social History     Socioeconomic History   • Marital status: Single     Spouse name: Not on file   • Number of children: Not on file   • Years of education: Not on file   • Highest education level: Not on file   Tobacco Use   • Smoking status: Light Tobacco Smoker     Years: 5.00     Types: Cigarettes   • Smokeless tobacco: Never Used   • Tobacco comment: quit at age 28   Substance and Sexual Activity   • Alcohol use: Yes     Comment: socially   • Sexual activity: Not Currently           Objective   Physical Exam  Vitals signs and nursing note reviewed.   Constitutional:       Appearance: She is well-developed.   HENT:      Head: Normocephalic and atraumatic.   Eyes:      Pupils: Pupils are equal, round, and reactive to light.   Neck:      Musculoskeletal: Normal range of motion.   Cardiovascular:      Rate and Rhythm: Normal rate and regular rhythm.   Pulmonary:      Effort: Pulmonary effort is normal. No respiratory distress.      Breath sounds: Normal breath sounds. No stridor. No wheezing, rhonchi or rales.   Chest:      Chest wall: No tenderness.   Abdominal:      General: Bowel sounds are normal. There is no distension.      Palpations: Abdomen is soft. There is no mass.      Tenderness: There is no abdominal tenderness. There is no guarding or rebound.      Hernia: No hernia is present.   Musculoskeletal: Normal range of motion.   Skin:     General: Skin is warm and dry.   Neurological:      General: No focal deficit present.      Mental Status: She  is alert and oriented to person, place, and time. Mental status is at baseline.      Cranial Nerves: No cranial nerve deficit.      Sensory: No sensory deficit.      Motor: No weakness.      Coordination: Coordination normal.   Psychiatric:         Mood and Affect: Mood normal.         Behavior: Behavior normal.         Thought Content: Thought content normal.         Judgment: Judgment normal.         Procedures           ED Course          Mri Brain Without Contrast    Result Date: 11/20/2020  1. Negative for acute ischemia or hemorrhage. 2. Moderate areas of predominantly periventricular white matter signal abnormality statistically likely related to chronic microvascular disease however demyelinating disease is also possible given the configuration. 3. Generalized cerebral atrophy. 4. Extra-axial dural based 19 x 11 mm previously seen calcified mass likely meningioma abutting the parasagittal left frontal lobe.  Electronically Signed By-Mark Epps MD On:11/20/2020 11:31 AM This report was finalized on [id number] by  Mark Epps MD.    Xr Chest 1 View    Result Date: 11/20/2020  No acute cardiopulmonary abnormality is identified.  Electronically Signed By-Becky Fernandez MD On:11/20/2020 9:55 AM This report was finalized on [id number] by  Becky Fernandez MD.    Labs Reviewed   COMPREHENSIVE METABOLIC PANEL - Abnormal; Notable for the following components:       Result Value    Glucose 118 (*)     All other components within normal limits    Narrative:     GFR Normal >60  Chronic Kidney Disease <60  Kidney Failure <15     URINALYSIS W/ CULTURE IF INDICATED - Abnormal; Notable for the following components:    Appearance, UA Cloudy (*)     Ketones, UA Trace (*)     Leuk Esterase, UA Large (3+) (*)     All other components within normal limits   CBC WITH AUTO DIFFERENTIAL - Abnormal; Notable for the following components:    Neutrophil % 84.7 (*)     Lymphocyte % 10.3 (*)     Monocyte % 4.1 (*)      Neutrophils, Absolute 7.60 (*)     All other components within normal limits   URINALYSIS, MICROSCOPIC ONLY - Abnormal; Notable for the following components:    WBC, UA Too Numerous to Count (*)     Bacteria, UA 2+ (*)     Squamous Epithelial Cells, UA 3-6 (*)     All other components within normal limits   COVID-19,ABBOTT IN-HOUSE,NP SWAB (NO TRANSPORT MEDIA) 2 HR TAT - Normal    Narrative:     Fact sheet for providers: https://www.fda.gov/media/621993/download     Fact sheet for patients: https://www.fda.gov/media/530662/download   LIPASE - Normal   TROPONIN (IN-HOUSE) - Normal    Narrative:     Troponin T Reference Range:  <= 0.03 ng/mL-   Negative for AMI  >0.03 ng/mL-     Abnormal for myocardial necrosis.  Clinicians would have to utilize clinical acumen, EKG, Troponin and serial changes to determine if it is an Acute Myocardial Infarction or myocardial injury due to an underlying chronic condition.       Results may be falsely decreased if patient taking Biotin.     URINE CULTURE   RAINBOW DRAW    Narrative:     The following orders were created for panel order [place] Draw.  Procedure                               Abnormality         Status                     ---------                               -----------         ------                     Green Top (Gel)[[id number]]                                  Final result                 Please view results for these tests on the individual orders.   CBC AND DIFFERENTIAL    Narrative:     The following orders were created for panel order CBC & Differential.  Procedure                               Abnormality         Status                     ---------                               -----------         ------                     CBC Auto Differential[[id number]]        Abnormal            Final result                 Please view results for these tests on the individual orders.   GREEN TOP     Medications   sodium chloride 0.9 % flush 10 mL (has no administration  in time range)   sodium chloride 0.9 % bolus 1,000 mL (1,000 mL Intravenous New Bag 11/20/20 1009)   cefTRIAXone (ROCEPHIN) in SWFI 1 gram/10ml IV PUSH syringe (1 g Intravenous Given 11/20/20 1243)                                       MDM  Number of Diagnoses or Management Options  Acute cystitis with hematuria:   Confusion:   Diagnosis management comments: I examined the patient using the appropriate personal protective equipment.      DISPOSITION:   Chart Review:  Comorbidity:  has a past medical history of Encounter for Zostavax administration, History of hepatitis A vaccination (2018), History of lymphopenia, Hypertension, Meningitis (1969), Multiple sclerosis (CMS/[facility]), Neuromuscular disorder (CMS/[facility]), Optic neuritis, Sleep apnea (09/01/2016), and Spinal stenosis.  Differentials:this list is not all inclusive and does not constitute the entirety of considered causes --> intracranial abnormality, dehydration, UTI  Labs: UA does show bacteria and leukocyte Estrace    Imaging: Was interpreted by physician and reviewed by myself:  Mri Brain Without Contrast    Result Date: 11/20/2020  1. Negative for acute ischemia or hemorrhage. 2. Moderate areas of predominantly periventricular white matter signal abnormality statistically likely related to chronic microvascular disease however demyelinating disease is also possible given the configuration. 3. Generalized cerebral atrophy. 4. Extra-axial dural based 19 x 11 mm previously seen calcified mass likely meningioma abutting the parasagittal left frontal lobe.  Electronically Signed By-Mark Epps MD On:11/20/2020 11:31 AM This report was finalized on [id number] by  Mark Epps MD.    Xr Chest 1 View    Result Date: 11/20/2020  No acute cardiopulmonary abnormality is identified.  Electronically Signed By-Becky Fernandez MD On:11/20/2020 9:55 AM This report was finalized on [id number] by  Becky Fernandez MD.      Disposition/Treatment:    Patient is a pleasant  65-year-old female who presents to the ER with increasing confusion.  She does have a urinary tract infection she was admitted for this.  She has a history of MS her MRI of her brain does show demyelinating disease.  She was stable in the ER she received Rocephin she was in agreement with plan       Amount and/or Complexity of Data Reviewed  Clinical lab tests: reviewed  Tests in the radiology section of CPT®: reviewed  Tests in the medicine section of CPT®: reviewed    Patient Progress  Patient progress: stable      Final diagnoses:   Acute cystitis with hematuria   Confusion            Summer Fields PA-C  11/20/20 4025

## 2020-11-20 NOTE — PLAN OF CARE
Pt got to floor at 1410 from the ER today. Pleasant & cooperative with no complaints. Awaiting urine culture results. Will continue to monitor.

## 2020-11-21 LAB
ANION GAP SERPL CALCULATED.3IONS-SCNC: 8 MMOL/L (ref 5–15)
BASOPHILS # BLD AUTO: 0.1 10*3/MM3 (ref 0–0.2)
BASOPHILS NFR BLD AUTO: 2.4 % (ref 0–1.5)
BUN SERPL-MCNC: 15 MG/DL (ref 8–23)
BUN/CREAT SERPL: 17.4 (ref 7–25)
CALCIUM SPEC-SCNC: 8.9 MG/DL (ref 8.6–10.5)
CHLORIDE SERPL-SCNC: 108 MMOL/L (ref 98–107)
CO2 SERPL-SCNC: 25 MMOL/L (ref 22–29)
CREAT SERPL-MCNC: 0.86 MG/DL (ref 0.57–1)
DEPRECATED RDW RBC AUTO: 42.4 FL (ref 37–54)
EOSINOPHIL # BLD AUTO: 0.2 10*3/MM3 (ref 0–0.4)
EOSINOPHIL NFR BLD AUTO: 4.4 % (ref 0.3–6.2)
ERYTHROCYTE [DISTWIDTH] IN BLOOD BY AUTOMATED COUNT: 13.6 % (ref 12.3–15.4)
GFR SERPL CREATININE-BSD FRML MDRD: 66 ML/MIN/1.73
GLUCOSE SERPL-MCNC: 98 MG/DL (ref 65–99)
HCT VFR BLD AUTO: 37.1 % (ref 34–46.6)
HGB BLD-MCNC: 12.5 G/DL (ref 12–15.9)
LYMPHOCYTES # BLD AUTO: 1.1 10*3/MM3 (ref 0.7–3.1)
LYMPHOCYTES NFR BLD AUTO: 23.3 % (ref 19.6–45.3)
MCH RBC QN AUTO: 29.8 PG (ref 26.6–33)
MCHC RBC AUTO-ENTMCNC: 33.7 G/DL (ref 31.5–35.7)
MCV RBC AUTO: 88.5 FL (ref 79–97)
MONOCYTES # BLD AUTO: 0.3 10*3/MM3 (ref 0.1–0.9)
MONOCYTES NFR BLD AUTO: 6.3 % (ref 5–12)
NEUTROPHILS NFR BLD AUTO: 3.1 10*3/MM3 (ref 1.7–7)
NEUTROPHILS NFR BLD AUTO: 63.6 % (ref 42.7–76)
NRBC BLD AUTO-RTO: 0.1 /100 WBC (ref 0–0.2)
PLATELET # BLD AUTO: 171 10*3/MM3 (ref 140–450)
PMV BLD AUTO: 7.8 FL (ref 6–12)
POTASSIUM SERPL-SCNC: 4.2 MMOL/L (ref 3.5–5.2)
RBC # BLD AUTO: 4.19 10*6/MM3 (ref 3.77–5.28)
SODIUM SERPL-SCNC: 141 MMOL/L (ref 136–145)
WBC # BLD AUTO: 4.9 10*3/MM3 (ref 3.4–10.8)

## 2020-11-21 PROCEDURE — 85025 COMPLETE CBC W/AUTO DIFF WBC: CPT | Performed by: NURSE PRACTITIONER

## 2020-11-21 PROCEDURE — 99225 PR SBSQ OBSERVATION CARE/DAY 25 MINUTES: CPT | Performed by: HOSPITALIST

## 2020-11-21 PROCEDURE — G0378 HOSPITAL OBSERVATION PER HR: HCPCS

## 2020-11-21 PROCEDURE — 25010000002 CEFTRIAXONE PER 250 MG: Performed by: NURSE PRACTITIONER

## 2020-11-21 PROCEDURE — 80048 BASIC METABOLIC PNL TOTAL CA: CPT | Performed by: NURSE PRACTITIONER

## 2020-11-21 RX ADMIN — MULTIPLE VITAMINS W/ MINERALS TAB 1 TABLET: TAB at 21:13

## 2020-11-21 RX ADMIN — CEFTRIAXONE SODIUM 1 G: 1 INJECTION, POWDER, FOR SOLUTION INTRAMUSCULAR; INTRAVENOUS at 09:39

## 2020-11-21 RX ADMIN — ATORVASTATIN CALCIUM 20 MG: 20 TABLET, FILM COATED ORAL at 21:13

## 2020-11-21 RX ADMIN — Medication 10 ML: at 21:12

## 2020-11-21 RX ADMIN — Medication 10 ML: at 09:39

## 2020-11-21 RX ADMIN — NEBIVOLOL HYDROCHLORIDE 20 MG: 10 TABLET ORAL at 09:39

## 2020-11-21 RX ADMIN — Medication 5000 UNITS: at 09:39

## 2020-11-21 RX ADMIN — MELOXICAM 15 MG: 15 TABLET ORAL at 21:13

## 2020-11-21 NOTE — PROGRESS NOTES
Discharge Planning Assessment   [place]     Patient Name: Radha Ballard  MRN: [id number]  Today's Date: 11/21/2020    Admit Date: 11/20/2020    Discharge Needs Assessment     Row Name 11/21/20 1615       Living Environment    Lives With  spouse    Current Living Arrangements  home/apartment/condo    Primary Care Provided by  self;spouse/significant other    Provides Primary Care For  no one, unable/limited ability to care for self    Family Caregiver if Needed  spouse    Quality of Family Relationships  helpful;supportive;involved       Resource/Environmental Concerns    Resource/Environmental Concerns  none       Transition Planning    Patient/Family Anticipates Transition to  home with family    Patient/Family Anticipated Services at Transition  none    Transportation Anticipated  car, drives self;other (see comments) Has a handicapped van that she drives.       Discharge Needs Assessment    Equipment Currently Used at Home  cpap;wheelchair, motorized;ramp;grab bar    Concerns to be Addressed  denies needs/concerns at this time    Equipment Needed After Discharge  none        Discharge Plan     Row Name 11/21/20 1620       Plan    Plan  Anticpate routine home with spouse.    Plan Comments  Pt returned my call and states she lives with her wife and their house is handicap accessible -she is back to her baseline and denies any needs .    Row Name 11/21/20 1516       Plan    Plan Comments  I called pt's rm -no ans. I called spouse # and left message.              Shannan Collins RN

## 2020-11-21 NOTE — PLAN OF CARE
Goal Outcome Evaluation:  Plan of Care Reviewed With: patient  Progress: no change  Outcome Summary: Patient is pleasant with no complaints, will continue to monitor.

## 2020-11-21 NOTE — PROGRESS NOTES
"      [facility] Medicine Services Daily Progress Note      Hospitalist Team  LOS 0 days      Patient Care Team:  Gretta Canada MD as PCP - General (Family Medicine)  Torres Martinez MD as Consulting Physician (Neurology)    Patient Location: 248/1      Subjective   Subjective   Complaining of feeling weak and tired, denies for any chest pain, no nausea or vomiting.   Chief Complaint / Subjective  Chief Complaint   Patient presents with   • Dizziness         Brief Synopsis of Hospital Course/HPI    Ms. Ballard is a 65 y.o. female with PMH of MS, HTN, HLD, KEYUR who presented to [facility] ER 11/20/20 with complaints of confusion. Her wife stated she was confused last night and having word finding difficulties. She had no headache, no lateralizing weakness, no dizziness. She denied any fever or recent illness. She has had some dysuria. She is prone to UTIs.     In the ER the patient had MRI brain that showed no acute ischemia or hemorrhage.  Moderate areas of predominantly periventricular white matter signal abnormality statistically like related to chronic microvascular disease however demyelinating disease is also possible given the configuration.  Generalized cerebral atrophy.  Masslike meningioma abutting the parasagittal left frontal lobe as previously seen. CXR negative.  Urinalysis showed large leukocytes, too numerous WBCs, 2+ bacteria.  Covid negative. She was started on IV Rocephin and admitted for further evaluation of UTI and confusion.        Date::          ROS      Objective   Objective      Vital Signs  Temp:  [98.2 °F (36.8 °C)-98.8 °F (37.1 °C)] 98.6 °F (37 °C)  Heart Rate:  [50-66] 64  Resp:  [18] 18  BP: (129-149)/(54-81) 129/64  Oxygen Therapy  SpO2: 93 %  Pulse Oximetry Type: Intermittent  Device (Oxygen Therapy): room air  Flowsheet Rows      First Filed Value   Admission Height  167.6 cm (66\") Documented at 11/20/2020 0914   Admission Weight  81.6 kg (180 lb) " Documented at 11/20/2020 0914        Intake & Output (last 3 days)       11/18 0701 - 11/19 0700 11/19 0701 - 11/20 0700 11/20 0701 - 11/21 0700 11/21 0701 - 11/22 0700    P.O.    240    IV Piggyback   1000 100    Total Intake(mL/kg)   1000 (10.9) 340 (3.7)    Net   +1000 +340                Lines, Drains & Airways    Active LDAs     Name:   Placement date:   Placement time:   Site:   Days:    Peripheral IV 11/20/20 1000 Right Antecubital   11/20/20    1000    Antecubital   1                  Physical Exam:    Physical Exam  Vitals signs and nursing note reviewed.   Constitutional:       General: She is not in acute distress.     Appearance: Normal appearance. She is well-developed. She is not ill-appearing, toxic-appearing or diaphoretic.   HENT:      Head: Normocephalic and atraumatic.      Right Ear: Ear canal and external ear normal.      Left Ear: Ear canal and external ear normal.      Nose: Nose normal. No congestion or rhinorrhea.      Mouth/Throat:      Mouth: Mucous membranes are moist.      Pharynx: No oropharyngeal exudate.   Eyes:      General: No scleral icterus.        Right eye: No discharge.         Left eye: No discharge.      Extraocular Movements: Extraocular movements intact.      Conjunctiva/sclera: Conjunctivae normal.      Pupils: Pupils are equal, round, and reactive to light.   Neck:      Musculoskeletal: Normal range of motion and neck supple. No neck rigidity or muscular tenderness.      Thyroid: No thyromegaly.      Vascular: No carotid bruit or JVD.      Trachea: No tracheal deviation.   Cardiovascular:      Rate and Rhythm: Normal rate and regular rhythm.      Pulses: Normal pulses.      Heart sounds: Normal heart sounds. No murmur. No friction rub. No gallop.    Pulmonary:      Effort: Pulmonary effort is normal. No respiratory distress.      Breath sounds: Normal breath sounds. No stridor. No wheezing, rhonchi or rales.   Chest:      Chest wall: No tenderness.   Abdominal:       General: Bowel sounds are normal. There is no distension.      Palpations: Abdomen is soft. There is no mass.      Tenderness: There is no abdominal tenderness. There is no guarding or rebound.      Hernia: No hernia is present.   Musculoskeletal: Normal range of motion.         General: No swelling, tenderness, deformity or signs of injury.      Right lower leg: No edema.      Left lower leg: No edema.   Lymphadenopathy:      Cervical: No cervical adenopathy.   Skin:     General: Skin is warm and dry.      Coloration: Skin is not jaundiced or pale.      Findings: No bruising, erythema or rash.   Neurological:      General: No focal deficit present.      Mental Status: She is alert and oriented to person, place, and time. Mental status is at baseline.      Cranial Nerves: No cranial nerve deficit.      Sensory: No sensory deficit.      Motor: No weakness or abnormal muscle tone.      Coordination: Coordination normal.   Psychiatric:         Mood and Affect: Mood normal.         Behavior: Behavior normal.         Thought Content: Thought content normal.         Judgment: Judgment normal.               Procedures:              Results Review:     I reviewed the patient's new clinical results.      Lab Results (last 24 hours)     Procedure Component Value Units Date/Time    Urine Culture - Urine, Urine, Catheter [[id number]]  (Normal) Collected: 11/20/20 1009    Specimen: Urine, Catheter Updated: 11/21/20 1045     Urine Culture Culture in progress    Basic Metabolic Panel [[id number]]  (Abnormal) Collected: 11/21/20 0430    Specimen: Blood Updated: 11/21/20 0530     Glucose 98 mg/dL      BUN 15 mg/dL      Creatinine 0.86 mg/dL      Sodium 141 mmol/L      Potassium 4.2 mmol/L      Comment: Slight hemolysis detected by analyzer. Results may be affected.        Chloride 108 mmol/L      CO2 25.0 mmol/L      Calcium 8.9 mg/dL      eGFR Non African Amer 66 mL/min/1.73      BUN/Creatinine Ratio 17.4     Anion Gap 8.0 mmol/L      Narrative:      GFR Normal >60  Chronic Kidney Disease <60  Kidney Failure <15      CBC Auto Differential [[id number]]  (Abnormal) Collected: 11/21/20 0430    Specimen: Blood Updated: 11/21/20 0502     WBC 4.90 10*3/mm3      RBC 4.19 10*6/mm3      Hemoglobin 12.5 g/dL      Hematocrit 37.1 %      MCV 88.5 fL      MCH 29.8 pg      MCHC 33.7 g/dL      RDW 13.6 %      RDW-SD 42.4 fl      MPV 7.8 fL      Platelets 171 10*3/mm3      Neutrophil % 63.6 %      Lymphocyte % 23.3 %      Monocyte % 6.3 %      Eosinophil % 4.4 %      Basophil % 2.4 %      Neutrophils, Absolute 3.10 10*3/mm3      Lymphocytes, Absolute 1.10 10*3/mm3      Monocytes, Absolute 0.30 10*3/mm3      Eosinophils, Absolute 0.20 10*3/mm3      Basophils, Absolute 0.10 10*3/mm3      nRBC 0.1 /100 WBC         No results found for: HGBA1C            Lab Results   Component Value Date    LIPASE 21 11/20/2020     Lab Results   Component Value Date    CHOL 168 10/27/2020    TRIG 116 10/27/2020    HDL 78 (H) 10/27/2020    LDL 70 10/27/2020       No results found for: INTRAOP, PREDX, FINALDX, COMDX    Microbiology Results (last 10 days)     Procedure Component Value - Date/Time    COVID-19, ABBOTT IN-HOUSE,NP Swab (NO TRANSPORT MEDIA) 2 HR TAT - Swab, Nasopharynx [[id number]]  (Normal) Collected: 11/20/20 1009    Lab Status: Final result Specimen: Swab from Nasopharynx Updated: 11/20/20 1030     COVID19 Not Detected    Narrative:      Fact sheet for providers: https://www.fda.gov/media/699743/download     Fact sheet for patients: https://www.fda.gov/media/396015/download    Urine Culture - Urine, Urine, Catheter [[id number]]  (Normal) Collected: 11/20/20 1009    Lab Status: Preliminary result Specimen: Urine, Catheter Updated: 11/21/20 1045     Urine Culture Culture in progress          ECG/EMG Results (most recent)     Procedure Component Value Units Date/Time    ECG 12 Lead [[id number]] Collected: 11/20/20 0955     Updated: 11/20/20 0957     QT Interval 449 ms      Narrative:      HEART RATE= 53  bpm  RR Interval= 1124  ms  MD Interval= 172  ms  P Horizontal Axis= 15  deg  P Front Axis= 83  deg  QRSD Interval= 98  ms  QT Interval= 449  ms  QRS Axis= 9  deg  T Wave Axis= 13  deg  - OTHERWISE NORMAL ECG -  Sinus bradycardia  Low voltage, precordial leads  Electronically Signed By:   Date and Time of Study: 2020-11-20 09:55:20                    Mri Brain Without Contrast    Result Date: 11/20/2020  1. Negative for acute ischemia or hemorrhage. 2. Moderate areas of predominantly periventricular white matter signal abnormality statistically likely related to chronic microvascular disease however demyelinating disease is also possible given the configuration. 3. Generalized cerebral atrophy. 4. Extra-axial dural based 19 x 11 mm previously seen calcified mass likely meningioma abutting the parasagittal left frontal lobe.  Electronically Signed By-Mark Epps MD On:11/20/2020 11:31 AM This report was finalized on [id number] by  Mark Epps MD.    Xr Chest 1 View    Result Date: 11/20/2020  No acute cardiopulmonary abnormality is identified.  Electronically Signed By-Becky Fernandez MD On:11/20/2020 9:55 AM This report was finalized on [id number] by  Becky Fernandez MD.          Xrays, labs reviewed personally by physician.    Medication Review:   I have reviewed the patient's current medication list      Scheduled Meds  atorvastatin, 20 mg, Oral, Nightly  cefTRIAXone, 1 g, Intravenous, Q24H  meloxicam, 15 mg, Oral, Nightly  multivitamin with minerals, 1 tablet, Oral, Nightly  nebivolol, 20 mg, Oral, Daily  sodium chloride, 10 mL, Intravenous, Q12H  vitamin D3, 5,000 Units, Oral, Daily        Meds Infusions       Meds PRN  •  acetaminophen **OR** acetaminophen **OR** acetaminophen  •  aluminum-magnesium hydroxide-simethicone  •  bisacodyl  •  bisacodyl  •  magnesium hydroxide  •  melatonin  •  ondansetron **OR** ondansetron  •  [COMPLETED] Insert peripheral IV **AND** sodium  chloride  •  sodium chloride        Assessment/Plan   Assessment/Plan     Active Hospital Problems    Diagnosis  POA   • **Acute cystitis with hematuria [N30.01]  Yes   • Acute metabolic encephalopathy [G93.41]  Yes   • Elevated cholesterol [E78.00]  Yes   • Hypertension [I10]  Yes   • Multiple sclerosis (CMS/HCC) [G35]  Yes      Resolved Hospital Problems   No resolved problems to display.       MEDICAL DECISION MAKING COMPLEXITY BY PROBLEM:     Acute cystitis  -UA: large leukocytes, too numerous WBCs, 2+ bacteria.  -follow urine culture  -IV rocephin     Acute metabolic encephalopathy resolved now.   -secondary to above  -MRI brain: no acute ischemia or hemorrhage.  Moderate areas of predominantly periventricular white matter signal abnormality statistically like related to chronic microvascular disease however demyelinating disease is also possible given the configuration.  Generalized cerebral atrophy.  Masslike meningioma abutting the parasagittal left frontal lobe as previously seen.  - resolved      Multiple sclerosis   -wheelchair bound  -cont home mobic     Hypertension  -cont home bystolic     Hyperlipidemia  -cont home statin     KEYUR  -cpap qhs prn        VTE Prophylaxis - SCDs    VTE Prophylaxis -   Mechanical Order History:      Ordered        11/20/20 1525  Place Sequential Compression Device  Once         11/20/20 1525  Maintain Sequential Compression Device  Continuous                 Pharmalogical Order History:     None            Code Status -   Code Status and Medical Interventions:   Ordered at: 11/20/20 1525     Level Of Support Discussed With:    Patient     Code Status:    CPR     Medical Interventions (Level of Support Prior to Arrest):    Full       This patient has been examined wearing appropriate Personal Protective Equipment and discussed with hospital infection control department. 11/21/20        Discharge Planning          Electronically signed by Cassandra Mcgee MD, 11/21/20,  11:48 EST.  [facility] Hospitalist Team

## 2020-11-21 NOTE — PROGRESS NOTES
Continued Stay Note  [facility]     Patient Name: Radha Ballard  MRN: [id number]  Today's Date: 11/21/2020    Admit Date: 11/20/2020    Discharge Plan     Row Name 11/21/20 1516       Plan    Plan Comments  I called pt's rm -no ans. I called spouse # and left message.                  Shannan Collins RN

## 2020-11-22 ENCOUNTER — READMISSION MANAGEMENT (OUTPATIENT)
Dept: CALL CENTER | Facility: HOSPITAL | Age: 65
End: 2020-11-22

## 2020-11-22 VITALS
BODY MASS INDEX: 32.51 KG/M2 | RESPIRATION RATE: 19 BRPM | WEIGHT: 202.3 LBS | SYSTOLIC BLOOD PRESSURE: 145 MMHG | HEART RATE: 70 BPM | DIASTOLIC BLOOD PRESSURE: 76 MMHG | HEIGHT: 66 IN | OXYGEN SATURATION: 95 % | TEMPERATURE: 97.5 F

## 2020-11-22 LAB
BACTERIA SPEC AEROBE CULT: NO GROWTH
QT INTERVAL: 449 MS

## 2020-11-22 PROCEDURE — G0378 HOSPITAL OBSERVATION PER HR: HCPCS

## 2020-11-22 PROCEDURE — 25010000002 CEFTRIAXONE PER 250 MG: Performed by: NURSE PRACTITIONER

## 2020-11-22 PROCEDURE — 99217 PR OBSERVATION CARE DISCHARGE MANAGEMENT: CPT | Performed by: HOSPITALIST

## 2020-11-22 RX ORDER — CEPHALEXIN 500 MG/1
500 CAPSULE ORAL 2 TIMES DAILY
Qty: 10 CAPSULE | Refills: 0 | Status: SHIPPED | OUTPATIENT
Start: 2020-11-22 | End: 2020-11-24 | Stop reason: SDUPTHER

## 2020-11-22 RX ADMIN — NEBIVOLOL HYDROCHLORIDE 20 MG: 10 TABLET ORAL at 09:36

## 2020-11-22 RX ADMIN — Medication 10 ML: at 09:36

## 2020-11-22 RX ADMIN — CEFTRIAXONE SODIUM 1 G: 1 INJECTION, POWDER, FOR SOLUTION INTRAMUSCULAR; INTRAVENOUS at 09:40

## 2020-11-22 RX ADMIN — Medication 5000 UNITS: at 09:36

## 2020-11-22 NOTE — PLAN OF CARE
Patient hopeful to be discharged today, but understanding that we are awaiting cultures. Continue current plan of care. Will monitor.   Problem: Adult Inpatient Plan of Care  Goal: Plan of Care Review  Outcome: Ongoing, Progressing  Goal: Patient-Specific Goal (Individualized)  Outcome: Ongoing, Progressing  Goal: Absence of Hospital-Acquired Illness or Injury  Outcome: Ongoing, Progressing  Intervention: Identify and Manage Fall Risk  Recent Flowsheet Documentation  Taken 11/21/2020 1700 by Becky Killian RN  Safety Promotion/Fall Prevention:   assistive device/personal items within reach   clutter free environment maintained   fall prevention program maintained   nonskid shoes/slippers when out of bed   room organization consistent   safety round/check completed  Taken 11/21/2020 1500 by Becky Killian RN  Safety Promotion/Fall Prevention:   assistive device/personal items within reach   clutter free environment maintained   fall prevention program maintained   room organization consistent   safety round/check completed   nonskid shoes/slippers when out of bed  Taken 11/21/2020 1300 by Becky Killian, RN  Safety Promotion/Fall Prevention:   assistive device/personal items within reach   clutter free environment maintained   fall prevention program maintained   nonskid shoes/slippers when out of bed   room organization consistent   safety round/check completed  Taken 11/21/2020 1100 by Bceky Killian, RN  Safety Promotion/Fall Prevention:   assistive device/personal items within reach   clutter free environment maintained   fall prevention program maintained   nonskid shoes/slippers when out of bed   room organization consistent   safety round/check completed  Taken 11/21/2020 0900 by Becky Killian, RN  Safety Promotion/Fall Prevention:   assistive device/personal items within reach   clutter free environment maintained   fall prevention program maintained   nonskid shoes/slippers when out of bed    room organization consistent   safety round/check completed  Taken 11/21/2020 0714 by Becky Killian RN  Safety Promotion/Fall Prevention:   assistive device/personal items within reach   clutter free environment maintained   fall prevention program maintained   nonskid shoes/slippers when out of bed   room organization consistent   safety round/check completed  Intervention: Prevent Infection  Recent Flowsheet Documentation  Taken 11/21/2020 1700 by Becky Killian RN  Infection Prevention:   environmental surveillance performed   equipment surfaces disinfected   hand hygiene promoted   personal protective equipment utilized   rest/sleep promoted   single patient room provided   visitors restricted/screened  Taken 11/21/2020 1500 by Becky Killian RN  Infection Prevention:   environmental surveillance performed   equipment surfaces disinfected   hand hygiene promoted   personal protective equipment utilized   rest/sleep promoted   single patient room provided   visitors restricted/screened  Taken 11/21/2020 1300 by Becky Killian RN  Infection Prevention:   environmental surveillance performed   equipment surfaces disinfected   hand hygiene promoted   personal protective equipment utilized   rest/sleep promoted   single patient room provided   visitors restricted/screened  Taken 11/21/2020 1100 by Becky Killian RN  Infection Prevention:   environmental surveillance performed   equipment surfaces disinfected   hand hygiene promoted   personal protective equipment utilized   rest/sleep promoted   single patient room provided   visitors restricted/screened  Taken 11/21/2020 0900 by Becky Killian RN  Infection Prevention:   environmental surveillance performed   equipment surfaces disinfected   hand hygiene promoted   personal protective equipment utilized   single patient room provided   visitors restricted/screened   rest/sleep promoted  Taken 11/21/2020 0714 by Becky Killian RN  Infection  Prevention:   environmental surveillance performed   equipment surfaces disinfected   hand hygiene promoted   personal protective equipment utilized   rest/sleep promoted   single patient room provided   visitors restricted/screened  Goal: Optimal Comfort and Wellbeing  Outcome: Ongoing, Progressing  Intervention: Provide Person-Centered Care  Recent Flowsheet Documentation  Taken 11/21/2020 0714 by Becky Killian, RN  Trust Relationship/Rapport:   care explained   thoughts/feelings acknowledged  Goal: Readiness for Transition of Care  Outcome: Ongoing, Progressing   Goal Outcome Evaluation:  Plan of Care Reviewed With: patient  Progress: no change

## 2020-11-22 NOTE — OUTREACH NOTE
Prep Survey      Responses   [facility] discharged [facility]?  [facility]   Is LACE score < 7 ?  Yes   Eligibility  [facility]   Date of Admission  11/20/20   Date of Discharge  11/22/20   Discharge Disposition  Home or Self Care   Discharge diagnosis  Acute cystitis with hematuria    Does the patient have one of the following disease processes/diagnoses(primary or secondary)?  Other   Does the patient have Home health ordered?  No   Is there a DME ordered?  No   Prep survey completed?  Yes          Dara Gonzalez RN

## 2020-11-22 NOTE — DISCHARGE SUMMARY
[facility] Medicine Services  DISCHARGE SUMMARY        Prepared For PCP:  Gretta Canada MD    Patient Name: Radha Ballard  : 1955  MRN: [id number]      Date of Admission:   2020    Date of Discharge:  2020    Length of stay:  LOS: 0 days     Hospital Course     Presenting Problem:   Confusion [R41.0]  Acute cystitis with hematuria [N30.01]      Active Hospital Problems    Diagnosis  POA   • **Acute cystitis with hematuria [N30.01]  Yes   • Acute metabolic encephalopathy [G93.41]  Yes   • Elevated cholesterol [E78.00]  Yes   • Hypertension [I10]  Yes   • Multiple sclerosis (CMS/HCC) [G35]  Yes      Resolved Hospital Problems   No resolved problems to display.           Hospital Course by problem list.    Acute cystitis  -UA: large leukocytes, too numerous WBCs, 2+ bacteria.  -Urine cultures negative  -IV rocephin, changed to oral Keflex for 4 more days at discharge.     Acute metabolic encephalopathy resolved now.   -secondary to above  -MRI brain: no acute ischemia or hemorrhage.  Moderate areas of predominantly periventricular white matter signal abnormality statistically like related to chronic microvascular disease however demyelinating disease is also possible given the configuration.  Generalized cerebral atrophy.  Masslike meningioma abutting the parasagittal left frontal lobe as previously seen.  - resolved      Multiple sclerosis   -wheelchair bound  -cont home mobic     Hypertension  -cont home bystolic     Hyperlipidemia  -cont home statin     KEYUR  -cpap qhs prn             Recommendation for Outpatient Providers:     Follow-up with family physician in a week time.        Reasons For Change In Medications and Indications for New Medications:        Day of Discharge     HPI:       Vital Signs:   Temp:  [97.5 °F (36.4 °C)-98.5 °F (36.9 °C)] 97.5 °F (36.4 °C)  Heart Rate:  [56-65] 60  Resp:  [16-18] 18  BP: (136-148)/(65-76) 146/76     Physical  Exam:  Physical Exam  Vitals signs and nursing note reviewed.   Constitutional:       General: She is not in acute distress.     Appearance: Normal appearance. She is well-developed. She is not ill-appearing, toxic-appearing or diaphoretic.   HENT:      Head: Normocephalic and atraumatic.      Right Ear: Ear canal and external ear normal.      Left Ear: Ear canal and external ear normal.      Nose: Nose normal. No congestion or rhinorrhea.      Mouth/Throat:      Mouth: Mucous membranes are moist.      Pharynx: No oropharyngeal exudate.   Eyes:      General: No scleral icterus.        Right eye: No discharge.         Left eye: No discharge.      Extraocular Movements: Extraocular movements intact.      Conjunctiva/sclera: Conjunctivae normal.      Pupils: Pupils are equal, round, and reactive to light.   Neck:      Musculoskeletal: Normal range of motion and neck supple. No neck rigidity or muscular tenderness.      Thyroid: No thyromegaly.      Vascular: No carotid bruit or JVD.      Trachea: No tracheal deviation.   Cardiovascular:      Rate and Rhythm: Normal rate and regular rhythm.      Pulses: Normal pulses.      Heart sounds: Normal heart sounds. No murmur. No friction rub. No gallop.    Pulmonary:      Effort: Pulmonary effort is normal. No respiratory distress.      Breath sounds: Normal breath sounds. No stridor. No wheezing, rhonchi or rales.   Chest:      Chest wall: No tenderness.   Abdominal:      General: Bowel sounds are normal. There is no distension.      Palpations: Abdomen is soft. There is no mass.      Tenderness: There is no abdominal tenderness. There is no guarding or rebound.      Hernia: No hernia is present.   Musculoskeletal: Normal range of motion.         General: No swelling, tenderness, deformity or signs of injury.      Right lower leg: No edema.      Left lower leg: No edema.   Lymphadenopathy:      Cervical: No cervical adenopathy.   Skin:     General: Skin is warm and dry.       Coloration: Skin is not jaundiced or pale.      Findings: No bruising, erythema or rash.   Neurological:      General: No focal deficit present.      Mental Status: She is alert and oriented to person, place, and time. Mental status is at baseline.      Cranial Nerves: No cranial nerve deficit.      Sensory: No sensory deficit.      Motor: No weakness or abnormal muscle tone.      Coordination: Coordination normal.   Psychiatric:         Mood and Affect: Mood normal.         Behavior: Behavior normal.         Thought Content: Thought content normal.         Judgment: Judgment normal.         Pertinent  and/or Most Recent Results     Results from last 7 days   Lab Units 11/21/20  0430 11/20/20  1009   WBC 10*3/mm3 4.90 9.00   HEMOGLOBIN g/dL 12.5 14.3   HEMATOCRIT % 37.1 42.8   PLATELETS 10*3/mm3 171 225   SODIUM mmol/L 141 142   POTASSIUM mmol/L 4.2 4.2   CHLORIDE mmol/L 108* 106   CO2 mmol/L 25.0 27.0   BUN mg/dL 15 15   CREATININE mg/dL 0.86 0.78   GLUCOSE mg/dL 98 118*   CALCIUM mg/dL 8.9 9.5     Results from last 7 days   Lab Units 11/20/20  1009   BILIRUBIN mg/dL 0.3   ALK PHOS U/L 99   ALT (SGPT) U/L 16   AST (SGOT) U/L 17           Invalid input(s): TG, LDLCALC, LDLREALC  Results from last 7 days   Lab Units 11/20/20  1009   TROPONIN T ng/mL <0.010       Brief Urine Lab Results  (Last result in the past 365 days)      Color   Clarity   Blood   Leuk Est   Nitrite   Protein   CREAT   Urine HCG        11/20/20 1009 Yellow Cloudy Negative Large (3+) Negative Negative               Microbiology Results Abnormal     Procedure Component Value - Date/Time    Urine Culture - Urine, Urine, Catheter [[id number]]  (Normal) Collected: 11/20/20 1009    Lab Status: Final result Specimen: Urine, Catheter Updated: 11/22/20 1046     Urine Culture No growth    COVID-19, ABBOTT IN-HOUSE,NP Swab (NO TRANSPORT MEDIA) 2 HR TAT - Swab, Nasopharynx [[id number]]  (Normal) Collected: 11/20/20 1009    Lab Status: Final result Specimen:  Swab from Nasopharynx Updated: 11/20/20 1030     COVID19 Not Detected    Narrative:      Fact sheet for providers: https://www.fda.gov/media/885706/download     Fact sheet for patients: https://www.fda.gov/media/976447/download          Mri Brain Without Contrast    Result Date: 11/20/2020  Impression: 1. Negative for acute ischemia or hemorrhage. 2. Moderate areas of predominantly periventricular white matter signal abnormality statistically likely related to chronic microvascular disease however demyelinating disease is also possible given the configuration. 3. Generalized cerebral atrophy. 4. Extra-axial dural based 19 x 11 mm previously seen calcified mass likely meningioma abutting the parasagittal left frontal lobe.  Electronically Signed By-Mark Epps MD On:11/20/2020 11:31 AM This report was finalized on [id number] by  Mark Epps MD.    Xr Chest 1 View    Result Date: 11/20/2020  Impression: No acute cardiopulmonary abnormality is identified.  Electronically Signed By-Becky Fernandez MD On:11/20/2020 9:55 AM This report was finalized on [id number] by  Becky Fernandez MD.                             Test Results Pending at Discharge        Procedures Performed           Consults:   Consults     Date and Time Order Name Status Description    11/20/2020 1221 Hospitalist (on-call MD unless specified) Completed             Discharge Details        Discharge Medications      New Medications      Instructions Start Date   cephalexin 500 MG capsule  Commonly known as: Keflex   500 mg, Oral, 2 Times Daily         Continue These Medications      Instructions Start Date   atorvastatin 20 MG tablet  Commonly known as: LIPITOR   TAKE 1 TABLET BY MOUTH DAILY      Bystolic 20 MG tablet  Generic drug: nebivolol   TAKE 1 TABLET DAILY      multivitamin with minerals tablet tablet   1 tablet, Oral, Nightly      Vitamin D3 50 MCG (2000 UT) tablet   Every 24 Hours         Stop These Medications    meloxicam 15 MG  tablet  Commonly known as: MOBIC            Allergies   Allergen Reactions   • Amlodipine Besylate Swelling   • Losartan Rash   • Lisinopril Cough         Discharge Disposition:  Home or Self Care    Diet:  Hospital:  Diet Order   Procedures   • Diet Cardiac; Healthy Heart         Discharge Activity:         CODE STATUS:    Code Status and Medical Interventions:   Ordered at: 11/20/20 1525     Level Of Support Discussed With:    Patient     Code Status:    CPR     Medical Interventions (Level of Support Prior to Arrest):    Full         Follow-up Appointments  No future appointments.          Condition on Discharge:      Stable      This patient has been examined wearing appropriate Personal Protective Equipment and discussed with hospital infection control department. 11/22/20      Electronically signed by Cassandra Mcgee MD, 11/22/20, 11:32 AM EST.      Time: I spent  25  minutes on this discharge activity which included face-to-face encounter with the patient/reviewing the data in the system/coordination of the care with the nursing staff as well as consultants/documentation/entering orders.

## 2020-11-22 NOTE — PLAN OF CARE
Reports she feels better and is ready for discharge. No confusion noted.   Problem: Adult Inpatient Plan of Care  Goal: Plan of Care Review  11/22/2020 1226 by Becky Killian RN  Outcome: Met  11/22/2020 1225 by Becky Killian RN  Outcome: Ongoing, Not Progressing  Goal: Patient-Specific Goal (Individualized)  11/22/2020 1226 by Becky Killian RN  Outcome: Met  11/22/2020 1225 by Becky Killian RN  Outcome: Ongoing, Not Progressing  Goal: Absence of Hospital-Acquired Illness or Injury  11/22/2020 1226 by Becky Killian RN  Outcome: Met  11/22/2020 1225 by Becky Killian RN  Outcome: Ongoing, Not Progressing  Intervention: Identify and Manage Fall Risk  Recent Flowsheet Documentation  Taken 11/22/2020 0910 by Becky Killian RN  Safety Promotion/Fall Prevention:   assistive device/personal items within reach   clutter free environment maintained   fall prevention program maintained   nonskid shoes/slippers when out of bed   room organization consistent   safety round/check completed  Taken 11/22/2020 0715 by Becky Killian RN  Safety Promotion/Fall Prevention:   assistive device/personal items within reach   clutter free environment maintained   fall prevention program maintained   nonskid shoes/slippers when out of bed   room organization consistent   safety round/check completed  Intervention: Prevent Infection  Recent Flowsheet Documentation  Taken 11/22/2020 0910 by Becky Killian RN  Infection Prevention:   environmental surveillance performed   equipment surfaces disinfected   hand hygiene promoted   personal protective equipment utilized   rest/sleep promoted   single patient room provided   visitors restricted/screened  Taken 11/22/2020 0715 by Becky Killian RN  Infection Prevention:   environmental surveillance performed   equipment surfaces disinfected   hand hygiene promoted   personal protective equipment utilized   rest/sleep promoted   single patient room provided    visitors restricted/screened  Goal: Optimal Comfort and Wellbeing  11/22/2020 1226 by Becky Killian, RN  Outcome: Met  11/22/2020 1225 by Becky Killian RN  Outcome: Ongoing, Not Progressing  Intervention: Provide Person-Centered Care  Recent Flowsheet Documentation  Taken 11/22/2020 0715 by Becky Killian RN  Trust Relationship/Rapport:   care explained   thoughts/feelings acknowledged  Goal: Readiness for Transition of Care  11/22/2020 1226 by Becky Killian RN  Outcome: Met  11/22/2020 1225 by Becky Killian RN  Outcome: Ongoing, Not Progressing   Goal Outcome Evaluation:  Plan of Care Reviewed With: patient  Progress: no change

## 2020-11-23 ENCOUNTER — TRANSITIONAL CARE MANAGEMENT TELEPHONE ENCOUNTER (OUTPATIENT)
Dept: CALL CENTER | Facility: HOSPITAL | Age: 65
End: 2020-11-23

## 2020-11-23 NOTE — OUTREACH NOTE
Call Center TCM Note      Responses   [facility] patient discharged from?  [facility]   Does the patient have one of the following disease processes/diagnoses(primary or secondary)?  Other   TCM attempt successful?  Yes   Call start time  1609   Call end time  1614   Meds reviewed with patient/caregiver?  Yes   Is the patient having any side effects they believe may be caused by any medication additions or changes?  No   Does the patient have all medications ordered at discharge?  Yes   Is the patient taking all medications as directed (includes completed medication regime)?  Yes   Does the patient have a primary care provider?   Yes   Does the patient have an appointment with their PCP within 7 days of discharge?  No   Comments regarding PCP  PCP FOLLOW UP APPOINTMENT SCHEDULED DURING THIS CALL FOR 11/24/20 AT 3:00   What is preventing the patient from scheduling follow up appointments within 7 days of discharge?  Haven't had time   Nursing Interventions  Educated patient on importance of making appointment   Urgent appointment interventions  Facilitated patient appointment   Has the patient kept scheduled appointments due by today?  N/A   Has home health visited the patient within 72 hours of discharge?  N/A   Did the patient receive a copy of their discharge instructions?  Yes   Nursing interventions  Reviewed instructions with patient   What is the patient's perception of their health status since discharge?  Improving   Is the patient/caregiver able to teach back signs and symptoms related to disease process for when to call PCP?  Yes   Is the patient/caregiver able to teach back signs and symptoms related to disease process for when to call 911?  Yes   Is the patient/caregiver able to teach back the hierarchy of who to call/visit for symptoms/problems? PCP, Specialist, Home health nurse, Urgent Care, ED, 911  Yes   If the patient is a current smoker, are they able to teach back resources for cessation?  Not a  smoker   TCM call completed?  Yes          Melissa Pena LPN    11/23/2020, 16:15 EST

## 2020-11-23 NOTE — PROGRESS NOTES
Case Management Discharge Note      Final Note: Home         Selected Continued Care - Discharged on 11/22/2020 Admission date: 11/20/2020 - Discharge disposition: Home or Self Care                 Final Discharge Disposition Code: 01 - home or self-care

## 2020-11-24 ENCOUNTER — OFFICE VISIT (OUTPATIENT)
Dept: FAMILY MEDICINE CLINIC | Facility: CLINIC | Age: 65
End: 2020-11-24

## 2020-11-24 VITALS — TEMPERATURE: 96.8 F

## 2020-11-24 DIAGNOSIS — N30.01 ACUTE CYSTITIS WITH HEMATURIA: Primary | ICD-10-CM

## 2020-11-24 PROCEDURE — 99496 TRANSJ CARE MGMT HIGH F2F 7D: CPT | Performed by: FAMILY MEDICINE

## 2020-11-24 RX ORDER — CEPHALEXIN 500 MG/1
500 CAPSULE ORAL 3 TIMES DAILY
Qty: 30 CAPSULE | Refills: 0 | Status: SHIPPED | OUTPATIENT
Start: 2020-11-24 | End: 2021-09-24

## 2020-11-24 RX ORDER — BACLOFEN 20 MG/1
TABLET ORAL
COMMUNITY
Start: 2020-11-20 | End: 2021-09-24

## 2020-11-24 RX ORDER — MELOXICAM 10 MG/1
10 CAPSULE ORAL
COMMUNITY
Start: 2020-08-15 | End: 2021-09-27 | Stop reason: SDUPTHER

## 2020-12-11 DIAGNOSIS — Z12.31 ENCOUNTER FOR SCREENING MAMMOGRAM FOR BREAST CANCER: ICD-10-CM

## 2020-12-11 DIAGNOSIS — Z00.00 ENCOUNTER FOR GENERAL ADULT MEDICAL EXAMINATION WITHOUT ABNORMAL FINDINGS: ICD-10-CM

## 2021-05-18 RX ORDER — NEBIVOLOL HYDROCHLORIDE 20 MG/1
TABLET ORAL
Qty: 90 TABLET | Refills: 3 | Status: SHIPPED | OUTPATIENT
Start: 2021-05-18 | End: 2022-03-06

## 2021-07-23 ENCOUNTER — TELEPHONE (OUTPATIENT)
Dept: FAMILY MEDICINE CLINIC | Facility: CLINIC | Age: 66
End: 2021-07-23

## 2021-07-23 DIAGNOSIS — Z12.11 SCREEN FOR COLON CANCER: Primary | ICD-10-CM

## 2021-07-23 NOTE — TELEPHONE ENCOUNTER
PATIENT STATES SHE IS DUE FOR A COLOGUARD SCREENING. WOULD LIKE TO HAVE THIS SET UP.    PLEASE ADVISE: [phone]

## 2021-09-24 ENCOUNTER — OFFICE VISIT (OUTPATIENT)
Dept: FAMILY MEDICINE CLINIC | Facility: CLINIC | Age: 66
End: 2021-09-24

## 2021-09-24 VITALS
SYSTOLIC BLOOD PRESSURE: 156 MMHG | TEMPERATURE: 97.7 F | BODY MASS INDEX: 29.73 KG/M2 | HEIGHT: 66 IN | WEIGHT: 185 LBS | DIASTOLIC BLOOD PRESSURE: 79 MMHG | OXYGEN SATURATION: 99 % | HEART RATE: 56 BPM

## 2021-09-24 DIAGNOSIS — K59.1 FUNCTIONAL DIARRHEA: Primary | ICD-10-CM

## 2021-09-24 PROCEDURE — 99213 OFFICE O/P EST LOW 20 MIN: CPT | Performed by: FAMILY MEDICINE

## 2021-09-24 RX ORDER — DIPHENOXYLATE HYDROCHLORIDE AND ATROPINE SULFATE 2.5; .025 MG/1; MG/1
1 TABLET ORAL 4 TIMES DAILY PRN
Qty: 120 TABLET | Refills: 0 | Status: SHIPPED | OUTPATIENT
Start: 2021-09-24 | End: 2021-10-05

## 2021-09-27 RX ORDER — MELOXICAM 15 MG/1
15 TABLET ORAL DAILY
Qty: 90 TABLET | Refills: 1 | Status: SHIPPED | OUTPATIENT
Start: 2021-09-27 | End: 2022-03-16

## 2021-09-29 ENCOUNTER — TELEPHONE (OUTPATIENT)
Dept: FAMILY MEDICINE CLINIC | Facility: CLINIC | Age: 66
End: 2021-09-29

## 2021-09-29 DIAGNOSIS — K59.1 FUNCTIONAL DIARRHEA: Primary | ICD-10-CM

## 2021-09-29 DIAGNOSIS — R10.84 GENERALIZED ABDOMINAL PAIN: ICD-10-CM

## 2021-09-29 NOTE — TELEPHONE ENCOUNTER
Caller: Radha Ballard    Relationship: Self    Best call back number: [phone]     What orders are you requesting (i.e. lab or imaging): ORDERS FOR CT SCAN AND REFERRAL TO GASTRO ENTEROLOGIST     In what timeframe would the patient need to come in: ASAP     Where will you receive your lab/imaging services: REFERRAL OUT     Additional notes: N/A

## 2021-10-04 DIAGNOSIS — K59.1 FUNCTIONAL DIARRHEA: ICD-10-CM

## 2021-10-05 RX ORDER — DIPHENOXYLATE HYDROCHLORIDE AND ATROPINE SULFATE 2.5; .025 MG/1; MG/1
TABLET ORAL
Qty: 120 TABLET | Refills: 0 | Status: SHIPPED | OUTPATIENT
Start: 2021-10-05 | End: 2022-08-31

## 2021-10-27 ENCOUNTER — HOSPITAL ENCOUNTER (OUTPATIENT)
Dept: CT IMAGING | Facility: HOSPITAL | Age: 66
Discharge: HOME | End: 2021-10-27
Admitting: FAMILY MEDICINE

## 2021-10-27 DIAGNOSIS — R10.84 GENERALIZED ABDOMINAL PAIN: ICD-10-CM

## 2021-10-27 DIAGNOSIS — K59.1 FUNCTIONAL DIARRHEA: ICD-10-CM

## 2021-10-27 DIAGNOSIS — N28.89 RIGHT RENAL MASS: Primary | ICD-10-CM

## 2021-10-27 LAB — CREAT BLDA-MCNC: 0.9 MG/DL (ref 0.6–1.3)

## 2021-10-27 PROCEDURE — 82565 ASSAY OF CREATININE: CPT

## 2021-10-27 PROCEDURE — 0 IOPAMIDOL PER 1 ML: Performed by: FAMILY MEDICINE

## 2021-10-27 PROCEDURE — 74177 CT ABD & PELVIS W/CONTRAST: CPT

## 2021-10-27 RX ADMIN — IOPAMIDOL 100 ML: 755 INJECTION, SOLUTION INTRAVENOUS at 17:24

## 2021-10-28 ENCOUNTER — TELEPHONE (OUTPATIENT)
Dept: FAMILY MEDICINE CLINIC | Facility: CLINIC | Age: 66
End: 2021-10-28

## 2021-11-02 ENCOUNTER — TELEPHONE (OUTPATIENT)
Dept: FAMILY MEDICINE CLINIC | Facility: CLINIC | Age: 66
End: 2021-11-02

## 2021-11-02 NOTE — TELEPHONE ENCOUNTER
Spoke with pt   Her referral was sent to [facility] and they contact her. I gave her their number.

## 2021-11-02 NOTE — TELEPHONE ENCOUNTER
PATIENT IS CHECKING IN STATUS OF UROLOGY REFERRAL   PATIENT STATES SHE IS WONDERING IF SHE NEEDS TO SCHEDULE OR WILL SOMEONE CALL HER    CALLBACK [phone] (

## 2021-11-04 ENCOUNTER — OFFICE VISIT (OUTPATIENT)
Dept: SURGERY | Facility: CLINIC | Age: 66
End: 2021-11-04

## 2021-11-04 VITALS — BODY MASS INDEX: 33.46 KG/M2 | HEIGHT: 66 IN | WEIGHT: 208.2 LBS

## 2021-11-04 DIAGNOSIS — K59.1 FUNCTIONAL DIARRHEA: Primary | ICD-10-CM

## 2021-11-04 PROCEDURE — 99203 OFFICE O/P NEW LOW 30 MIN: CPT | Performed by: SURGERY

## 2021-11-04 NOTE — H&P (VIEW-ONLY)
Cc: Change in bowel habits    History of presenting illness:   This is a nice 66-year-old female who is wheelchair dependent related to progressive multiple sclerosis.  She is able to stand up and transfer.  She says that up until about the last month she tended to be more on the constipated side, but over the last month has had significant loose stools and some associated abdominal cramping.  She has recently restarted taking Benefiber, without any noticeable improvement to this point.  She has also used Lomotil, and although this may hold stools off for a while, her remaining stools remain loose.  She denies any blood in her stools.  No significant change in appetite.    Past Medical History: Multiple sclerosis, obesity, hypertension, hyperlipidemia, spinal stenosis    Past Surgical History: Significant for right breast biopsy 1998, spine surgery and what sounds like perhaps meningioma excision.  She denies any history of abdominal surgery.  She reports her last colonoscopy was done around 20 years ago, and says it was normal at that time.    Medications: Atorvastatin, Bystolic, meloxicam, multivitamin, Lomotil    Allergies: Amlodipine causes swelling, losartan causes rash, lisinopril causes cough    Social History: She is a former smoker who quit greater than 35 years ago.  She lives with only some assistance, able to manage most of her activities of daily living.    Family History: Negative for colorectal cancer or inflammatory bowel disease    Review of Systems:  Constitutional: Positive for some gradual weight gain, negative for fever or chills  Neck: no swollen glands or dysphagia or odynophagia  Respiratory: negative for SOB, cough, hemoptysis or wheezing  Cardiovascular: negative for chest pain, palpitations or peripheral edema  Gastrointestinal: Positive for diarrhea, crampy abdominal pain, negative for rectal bleeding      Physical Exam:  BMI: 33.6  General: alert and oriented, appropriate, no acute  distress  Eyes: No scleral icterus, extraocular movements are intact  Neck: Supple without lymphadenopathy or thyromegaly, trachea is in the midline  Respiratory: There is good bilateral chest expansion, no use of accessory muscles is noted  Cardiovascular: No jugular venous distention or peripheral edema is seen  Gastrointestinal: Soft, benign, no mass, no hernia    Laboratory data: Recent Cologuard study negative    Imaging data: Recent CT of the abdomen and pelvis reviewed and there are no significant inflammatory findings or evidence of mass.  Sigmoid colon does appear fairly redundant.  There is a question of the lesion within the right renal collecting system for which the patient is going to follow-up with urology.      Assessment and plan:   -Change in bowel habits  -Last colonoscopy done around 20 years ago  -Options discussed with patient.  Though she had a negative Cologuard study, I think that colonoscopy is certainly reasonable.  I think that random biopsies would be appropriate.  I suspect that more likely there is no surgically correctable disease to be identified here.  I recommended high-fiber diet, she should consider addition of probiotics and if symptoms persist might consider evaluation with gastroenterology.  -Risks associated with the procedure include anesthetic risks, cardiopulmonary risks, bleeding, perforation, patient is agreeable to proceed.    Pardeep Aponte MD, FACS  General, Minimally Invasive and Endoscopic Surgery  [facility]    [address], [address]  [place], [place], [address]  P: [phone]  F: [phone]

## 2021-11-04 NOTE — PROGRESS NOTES
Cc: Change in bowel habits    History of presenting illness:   This is a nice 66-year-old female who is wheelchair dependent related to progressive multiple sclerosis.  She is able to stand up and transfer.  She says that up until about the last month she tended to be more on the constipated side, but over the last month has had significant loose stools and some associated abdominal cramping.  She has recently restarted taking Benefiber, without any noticeable improvement to this point.  She has also used Lomotil, and although this may hold stools off for a while, her remaining stools remain loose.  She denies any blood in her stools.  No significant change in appetite.    Past Medical History: Multiple sclerosis, obesity, hypertension, hyperlipidemia, spinal stenosis    Past Surgical History: Significant for right breast biopsy 1998, spine surgery and what sounds like perhaps meningioma excision.  She denies any history of abdominal surgery.  She reports her last colonoscopy was done around 20 years ago, and says it was normal at that time.    Medications: Atorvastatin, Bystolic, meloxicam, multivitamin, Lomotil    Allergies: Amlodipine causes swelling, losartan causes rash, lisinopril causes cough    Social History: She is a former smoker who quit greater than 35 years ago.  She lives with only some assistance, able to manage most of her activities of daily living.    Family History: Negative for colorectal cancer or inflammatory bowel disease    Review of Systems:  Constitutional: Positive for some gradual weight gain, negative for fever or chills  Neck: no swollen glands or dysphagia or odynophagia  Respiratory: negative for SOB, cough, hemoptysis or wheezing  Cardiovascular: negative for chest pain, palpitations or peripheral edema  Gastrointestinal: Positive for diarrhea, crampy abdominal pain, negative for rectal bleeding      Physical Exam:  BMI: 33.6  General: alert and oriented, appropriate, no acute  distress  Eyes: No scleral icterus, extraocular movements are intact  Neck: Supple without lymphadenopathy or thyromegaly, trachea is in the midline  Respiratory: There is good bilateral chest expansion, no use of accessory muscles is noted  Cardiovascular: No jugular venous distention or peripheral edema is seen  Gastrointestinal: Soft, benign, no mass, no hernia    Laboratory data: Recent Cologuard study negative    Imaging data: Recent CT of the abdomen and pelvis reviewed and there are no significant inflammatory findings or evidence of mass.  Sigmoid colon does appear fairly redundant.  There is a question of the lesion within the right renal collecting system for which the patient is going to follow-up with urology.      Assessment and plan:   -Change in bowel habits  -Last colonoscopy done around 20 years ago  -Options discussed with patient.  Though she had a negative Cologuard study, I think that colonoscopy is certainly reasonable.  I think that random biopsies would be appropriate.  I suspect that more likely there is no surgically correctable disease to be identified here.  I recommended high-fiber diet, she should consider addition of probiotics and if symptoms persist might consider evaluation with gastroenterology.  -Risks associated with the procedure include anesthetic risks, cardiopulmonary risks, bleeding, perforation, patient is agreeable to proceed.    Pardeep Aponte MD, FACS  General, Minimally Invasive and Endoscopic Surgery  [facility]    [address], [address]  [place], [address]  P: [phone]  F: [phone]

## 2021-11-09 ENCOUNTER — HOSPITAL ENCOUNTER (OUTPATIENT)
Facility: HOSPITAL | Age: 66
Setting detail: HOSPITAL OUTPATIENT SURGERY
Discharge: HOME | End: 2021-11-09
Attending: SURGERY | Admitting: SURGERY

## 2021-11-09 ENCOUNTER — ANESTHESIA EVENT (OUTPATIENT)
Dept: GASTROENTEROLOGY | Facility: HOSPITAL | Age: 66
End: 2021-11-09

## 2021-11-09 ENCOUNTER — ANESTHESIA (OUTPATIENT)
Dept: GASTROENTEROLOGY | Facility: HOSPITAL | Age: 66
End: 2021-11-09

## 2021-11-09 VITALS
HEIGHT: 66 IN | SYSTOLIC BLOOD PRESSURE: 156 MMHG | DIASTOLIC BLOOD PRESSURE: 84 MMHG | WEIGHT: 202.3 LBS | BODY MASS INDEX: 32.51 KG/M2 | OXYGEN SATURATION: 100 % | RESPIRATION RATE: 18 BRPM | HEART RATE: 60 BPM

## 2021-11-09 DIAGNOSIS — K59.1 FUNCTIONAL DIARRHEA: ICD-10-CM

## 2021-11-09 PROCEDURE — 25010000002 PROPOFOL 10 MG/ML EMULSION: Performed by: NURSE ANESTHETIST, CERTIFIED REGISTERED

## 2021-11-09 PROCEDURE — 45380 COLONOSCOPY AND BIOPSY: CPT | Performed by: SURGERY

## 2021-11-09 PROCEDURE — C1889 IMPLANT/INSERT DEVICE, NOC: HCPCS | Performed by: SURGERY

## 2021-11-09 PROCEDURE — 88305 TISSUE EXAM BY PATHOLOGIST: CPT | Performed by: SURGERY

## 2021-11-09 DEVICE — DEV CLIP ENDO RESOLUTION360 CONTRL ROT 235CM: Type: IMPLANTABLE DEVICE | Site: ASCENDING COLON | Status: FUNCTIONAL

## 2021-11-09 RX ORDER — SODIUM CHLORIDE, SODIUM LACTATE, POTASSIUM CHLORIDE, CALCIUM CHLORIDE 600; 310; 30; 20 MG/100ML; MG/100ML; MG/100ML; MG/100ML
30 INJECTION, SOLUTION INTRAVENOUS CONTINUOUS PRN
Status: DISCONTINUED | OUTPATIENT
Start: 2021-11-09 | End: 2021-11-09 | Stop reason: HOSPADM

## 2021-11-09 RX ORDER — PROPOFOL 10 MG/ML
VIAL (ML) INTRAVENOUS AS NEEDED
Status: DISCONTINUED | OUTPATIENT
Start: 2021-11-09 | End: 2021-11-09 | Stop reason: SURG

## 2021-11-09 RX ORDER — LIDOCAINE HYDROCHLORIDE 20 MG/ML
INJECTION, SOLUTION INFILTRATION; PERINEURAL AS NEEDED
Status: DISCONTINUED | OUTPATIENT
Start: 2021-11-09 | End: 2021-11-09 | Stop reason: SURG

## 2021-11-09 RX ORDER — PROPOFOL 10 MG/ML
VIAL (ML) INTRAVENOUS CONTINUOUS PRN
Status: DISCONTINUED | OUTPATIENT
Start: 2021-11-09 | End: 2021-11-09 | Stop reason: SURG

## 2021-11-09 RX ADMIN — SODIUM CHLORIDE, POTASSIUM CHLORIDE, SODIUM LACTATE AND CALCIUM CHLORIDE 30 ML/HR: 600; 310; 30; 20 INJECTION, SOLUTION INTRAVENOUS at 13:32

## 2021-11-09 RX ADMIN — Medication 180 MCG/KG/MIN: at 14:11

## 2021-11-09 RX ADMIN — LIDOCAINE HYDROCHLORIDE 60 MG: 20 INJECTION, SOLUTION INFILTRATION; PERINEURAL at 14:11

## 2021-11-09 RX ADMIN — PROPOFOL 100 MG: 10 INJECTION, EMULSION INTRAVENOUS at 14:11

## 2021-11-09 NOTE — ANESTHESIA PREPROCEDURE EVALUATION
Anesthesia Evaluation     Patient summary reviewed and Nursing notes reviewed                Airway   Mallampati: II  TM distance: >3 FB  Neck ROM: full  No difficulty expected  Dental - normal exam     Pulmonary - normal exam   (+) a smoker Former, sleep apnea on CPAP,   Cardiovascular - normal exam    (+) hypertension less than 2 medications, hyperlipidemia,       Neuro/Psych  (+) numbness,       ROS Comment: Hx of MS, but no breathing or swallowing problems  GI/Hepatic/Renal/Endo    (+) obesity,       Musculoskeletal     Abdominal   (+) obese,    Substance History   (+) drug use      Comment: marijuana   OB/GYN          Other   arthritis,                    Anesthesia Plan    ASA 3     MAC     intravenous induction     Anesthetic plan, all risks, benefits, and alternatives have been provided, discussed and informed consent has been obtained with: patient.    Plan discussed with CRNA.

## 2021-11-09 NOTE — ANESTHESIA POSTPROCEDURE EVALUATION
Patient: Radha Ballard    Procedure Summary     Date: 11/09/21 Room / Location: [facility] ENDOSCOPY 10 /  [facility]    Anesthesia Start: 1404 Anesthesia Stop: 1442    Procedure: COLONOSCOPY to cecum with polypectomy and biopsies (cold bx, cliip) (N/A ) Diagnosis:       Functional diarrhea      (Functional diarrhea [K59.1])    Surgeons: Pardeep Aponte MD Provider: Remington Ramos MD    Anesthesia Type: MAC ASA Status: 3          Anesthesia Type: MAC    Vitals  Vitals Value Taken Time   /65 11/09/21 1440   Temp     Pulse 66 11/09/21 1440   Resp 18 11/09/21 1440   SpO2 99 % 11/09/21 1440           Post Anesthesia Care and Evaluation    Patient location during evaluation: PHASE II  Patient participation: complete - patient participated  Level of consciousness: awake and alert  Pain management: adequate  Airway patency: patent  Anesthetic complications: No anesthetic complications  PONV Status: none  Cardiovascular status: acceptable and hemodynamically stable  Respiratory status: acceptable, nonlabored ventilation and spontaneous ventilation  Hydration status: acceptable

## 2021-11-09 NOTE — OP NOTE
Operative Note :   Pardeep Aponte MD    Radha Ballard  1955    Procedure Date: 11/09/21    Pre-op Diagnosis:  · Change in bowel habits    Post-op Diagnosis:  · Colon polyp  · Diverticulosis    Procedure:   · Colonoscopy to cecum with cold forceps polypectomy and multiple random biopsies    Surgeon: Pardeep Aponte MD      Anesthesia: MAC    Indications:  · 66-year-old patient with recent change in bowel habits with loose stools and diarrhea.  She had a Cologuard test which was negative recently.  She has not had a colonoscopy in around 20 years.    Findings:   · Mild diverticulosis  · Benign-appearing polyp in the proximal ascending colon    Recommendations:   · Follow-up polyp pathology  · Follow-up random biopsies  · High-fiber diet  · Consider GI referral if loose stools persist    Description of procedure:    After obtaining informed consent, patient was brought to the endoscopy suite and was sedated.  Digital rectal exam was undertaken and was unremarkable.  The flexible colonoscope was then introduced through the rectum and passed around to the level of the cecum.  External pressure was required to gain access to the cecum.  The appendiceal orifice and ileocecal valve appeared normal.  The scope was then slowly withdrawn, carefully visualizing all mucosal surfaces.  Mucosa appeared normal.  Biopsies of the cecum were taken randomly.  In the proximal ascending colon there was a small benign-appearing polyp removed with 3 bites of the polypectomy forceps.  There was a small amount of bleeding here, and as such a clip was applied.  Random biopsies were then also taken of the ascending colon.  Scope was pulled back to the transverse colon colon and biopsies were taken of the hepatic flexure and transverse colon.  Splenic flexure was biopsied and appeared normal.  The descending colon appeared normal.  Sigmoid colon had a few diverticula, but generally looks pretty healthy.  Biopsies were also taken of  the sigmoid as well as the rectum.  Rectum was normal on withdrawal.    Pardeep Aponte MD  General and Endoscopic Surgery  [facility]    [address], [address]  [place], [place], [address]  P: [phone]  F: [phone]

## 2021-11-11 LAB
LAB AP CASE REPORT: NORMAL
PATH REPORT.ADDENDUM SPEC: NORMAL
PATH REPORT.FINAL DX SPEC: NORMAL
PATH REPORT.GROSS SPEC: NORMAL

## 2021-11-15 RX ORDER — ATORVASTATIN CALCIUM 20 MG/1
TABLET, FILM COATED ORAL
Qty: 90 TABLET | Refills: 3 | Status: SHIPPED | OUTPATIENT
Start: 2021-11-15 | End: 2022-11-09

## 2021-11-23 ENCOUNTER — LAB (OUTPATIENT)
Dept: LAB | Facility: HOSPITAL | Age: 66
End: 2021-11-23

## 2021-11-23 ENCOUNTER — TRANSCRIBE ORDERS (OUTPATIENT)
Dept: ADMINISTRATIVE | Facility: HOSPITAL | Age: 66
End: 2021-11-23

## 2021-11-23 ENCOUNTER — HOSPITAL ENCOUNTER (OUTPATIENT)
Dept: CARDIOLOGY | Facility: HOSPITAL | Age: 66
Discharge: HOME | End: 2021-11-23

## 2021-11-23 DIAGNOSIS — Z01.818 PRE-OP TESTING: Primary | ICD-10-CM

## 2021-11-23 DIAGNOSIS — Z01.818 PRE-OP TESTING: ICD-10-CM

## 2021-11-23 LAB
ANION GAP SERPL CALCULATED.3IONS-SCNC: 10.3 MMOL/L (ref 5–15)
BASOPHILS # BLD AUTO: 0.05 10*3/MM3 (ref 0–0.2)
BASOPHILS NFR BLD AUTO: 1 % (ref 0–1.5)
BUN SERPL-MCNC: 17 MG/DL (ref 8–23)
BUN/CREAT SERPL: 19.5 (ref 7–25)
CALCIUM SPEC-SCNC: 9.4 MG/DL (ref 8.6–10.5)
CHLORIDE SERPL-SCNC: 106 MMOL/L (ref 98–107)
CO2 SERPL-SCNC: 26.7 MMOL/L (ref 22–29)
CREAT SERPL-MCNC: 0.87 MG/DL (ref 0.57–1)
DEPRECATED RDW RBC AUTO: 43.4 FL (ref 37–54)
EOSINOPHIL # BLD AUTO: 0.16 10*3/MM3 (ref 0–0.4)
EOSINOPHIL NFR BLD AUTO: 3.3 % (ref 0.3–6.2)
ERYTHROCYTE [DISTWIDTH] IN BLOOD BY AUTOMATED COUNT: 13 % (ref 12.3–15.4)
GFR SERPL CREATININE-BSD FRML MDRD: 65 ML/MIN/1.73
GLUCOSE SERPL-MCNC: 104 MG/DL (ref 65–99)
HCT VFR BLD AUTO: 40.9 % (ref 34–46.6)
HGB BLD-MCNC: 13.1 G/DL (ref 12–15.9)
IMM GRANULOCYTES # BLD AUTO: 0.01 10*3/MM3 (ref 0–0.05)
IMM GRANULOCYTES NFR BLD AUTO: 0.2 % (ref 0–0.5)
LYMPHOCYTES # BLD AUTO: 0.98 10*3/MM3 (ref 0.7–3.1)
LYMPHOCYTES NFR BLD AUTO: 20.5 % (ref 19.6–45.3)
MCH RBC QN AUTO: 29.3 PG (ref 26.6–33)
MCHC RBC AUTO-ENTMCNC: 32 G/DL (ref 31.5–35.7)
MCV RBC AUTO: 91.5 FL (ref 79–97)
MONOCYTES # BLD AUTO: 0.29 10*3/MM3 (ref 0.1–0.9)
MONOCYTES NFR BLD AUTO: 6.1 % (ref 5–12)
NEUTROPHILS NFR BLD AUTO: 3.3 10*3/MM3 (ref 1.7–7)
NEUTROPHILS NFR BLD AUTO: 68.9 % (ref 42.7–76)
NRBC BLD AUTO-RTO: 0 /100 WBC (ref 0–0.2)
PLATELET # BLD AUTO: 168 10*3/MM3 (ref 140–450)
PMV BLD AUTO: 11.1 FL (ref 6–12)
POTASSIUM SERPL-SCNC: 4.9 MMOL/L (ref 3.5–5.2)
RBC # BLD AUTO: 4.47 10*6/MM3 (ref 3.77–5.28)
SODIUM SERPL-SCNC: 143 MMOL/L (ref 136–145)
WBC NRBC COR # BLD: 4.79 10*3/MM3 (ref 3.4–10.8)

## 2021-11-23 PROCEDURE — 85025 COMPLETE CBC W/AUTO DIFF WBC: CPT

## 2021-11-23 PROCEDURE — 80048 BASIC METABOLIC PNL TOTAL CA: CPT

## 2021-11-23 PROCEDURE — 36415 COLL VENOUS BLD VENIPUNCTURE: CPT

## 2021-11-23 PROCEDURE — 93010 ELECTROCARDIOGRAM REPORT: CPT | Performed by: INTERNAL MEDICINE

## 2021-11-23 PROCEDURE — 93005 ELECTROCARDIOGRAM TRACING: CPT

## 2021-12-02 LAB — QT INTERVAL: 449 MS

## 2021-12-13 ENCOUNTER — OFFICE VISIT (OUTPATIENT)
Dept: GASTROENTEROLOGY | Facility: CLINIC | Age: 66
End: 2021-12-13

## 2021-12-13 VITALS — WEIGHT: 202.1 LBS | BODY MASS INDEX: 32.48 KG/M2 | HEIGHT: 66 IN | TEMPERATURE: 96.1 F

## 2021-12-13 DIAGNOSIS — K59.1 FUNCTIONAL DIARRHEA: Primary | ICD-10-CM

## 2021-12-13 PROCEDURE — 99203 OFFICE O/P NEW LOW 30 MIN: CPT | Performed by: INTERNAL MEDICINE

## 2021-12-13 NOTE — PROGRESS NOTES
Chief Complaint   Patient presents with   • Diarrhea     Subjective     HPI  Radha Ballard is a 66 y.o. female who presents today for new office visit  66-year-old with issues with change in bowel habits and diarrhea off and on for the last 6 months.  Up included a CAT scan routine blood testing and a colonoscopy with biopsies all within normal limits.  No evidence of microscopic colitis.  She immediately stopped all of her vitamin supplements and her fiber therapy and her stools firmed up.  They are completely normal now.  She believes it was the excess Benefiber causing her loose stools.  Again she is symptom-free at this time    Objective   Vitals:    12/13/21 1434   Temp: 96.1 °F (35.6 °C)       Physical Exam  Vitals reviewed.   Constitutional:       Appearance: She is well-developed.   HENT:      Head: Normocephalic and atraumatic.   Abdominal:      General: Bowel sounds are normal. There is no distension.      Palpations: Abdomen is soft. There is no mass.      Tenderness: There is no abdominal tenderness.      Hernia: No hernia is present.   Skin:     General: Skin is warm and dry.   Neurological:      Mental Status: She is alert and oriented to person, place, and time.   Psychiatric:         Behavior: Behavior normal.         Thought Content: Thought content normal.         Judgment: Judgment normal.       The following data was reviewed by: Farhan Britt MD on 12/13/2021:  Common labs    Common Labsle 10/27/21 11/23/21 11/23/21     1147 1147   Glucose   104 (A)   BUN   17   Creatinine 0.90  0.87   eGFR Non African Am   65   Sodium   143   Potassium   4.9   Chloride   106   Calcium   9.4   WBC  4.79    Hemoglobin  13.1    Hematocrit  40.9    Platelets  168    (A) Abnormal value       Comments are available for some flowsheets but are not being displayed.           CMP    CMP 10/27/21 11/23/21   Glucose  104 (A)   BUN  17   Creatinine 0.90 0.87   eGFR Non African Am  65   Sodium  143   Potassium  4.9    Chloride  106   Calcium  9.4   (A) Abnormal value       Comments are available for some flowsheets but are not being displayed.           CBC    CBC 11/23/21   WBC 4.79   RBC 4.47   Hemoglobin 13.1   Hematocrit 40.9   MCV 91.5   MCH 29.3   MCHC 32.0   RDW 13.0   Platelets 168           CBC w/diff    CBC w/Diff 11/23/21   WBC 4.79   RBC 4.47   Hemoglobin 13.1   Hematocrit 40.9   MCV 91.5   MCH 29.3   MCHC 32.0   RDW 13.0   Platelets 168   Neutrophil Rel % 68.9   Immature Granulocyte Rel % 0.2   Lymphocyte Rel % 20.5   Monocyte Rel % 6.1   Eosinophil Rel % 3.3   Basophil Rel % 1.0                   Data reviewed: GI studies I reviewed colonoscopy with Dr. Claros to include a tubular adenoma colon polyp and biopsies negative for microscopic colitis     Assessment/Plan   Assessment:     Diarrhea which has resolved  Diarrhea secondary to vitamin supplements or Benefiber  Tubular adenoma colon polyp      Plan:   Her diarrhea is resolved after stopping her dietary supplements and Benefiber  If her diarrhea returns I will perform a celiac blood test and routine stool studies to include GI PCR, fecal Gustavo, pancreas elastase and stool for fecal fat  Colonoscopy should be repeated in 5 years for surveillance for colon polyps  She will follow-up with her PCP as she is symptom-free at this time    I spent20 minutes caring for Radha on this date of service. This time includes time spent by me in the following activities:preparing for the visit, reviewing tests, obtaining and/or reviewing a separately obtained history, performing a medically appropriate examination and/or evaluation , counseling and educating the patient/family/caregiver, ordering medications, tests, or procedures, referring and communicating with other health care professionals , documenting information in the medical record, independently interpreting results and communicating that information with the patient/family/caregiver and care coordination    Farhan MANCUSO  MD Lorenza  [facility]  [facility] [place] [address]  Office: [phone]

## 2022-03-06 RX ORDER — NEBIVOLOL 20 MG/1
TABLET ORAL
Qty: 90 TABLET | Refills: 3 | Status: SHIPPED | OUTPATIENT
Start: 2022-03-06 | End: 2022-12-20

## 2022-03-16 RX ORDER — MELOXICAM 15 MG/1
15 TABLET ORAL DAILY
Qty: 90 TABLET | Refills: 1 | Status: SHIPPED | OUTPATIENT
Start: 2022-03-16 | End: 2022-09-02

## 2022-08-02 ENCOUNTER — HOSPITAL ENCOUNTER (EMERGENCY)
Facility: HOSPITAL | Age: 67
Discharge: HOME | End: 2022-08-02
Attending: EMERGENCY MEDICINE | Admitting: EMERGENCY MEDICINE

## 2022-08-02 VITALS
OXYGEN SATURATION: 98 % | HEIGHT: 66 IN | TEMPERATURE: 97.3 F | HEART RATE: 67 BPM | DIASTOLIC BLOOD PRESSURE: 50 MMHG | BODY MASS INDEX: 32.14 KG/M2 | WEIGHT: 200 LBS | RESPIRATION RATE: 18 BRPM | SYSTOLIC BLOOD PRESSURE: 126 MMHG

## 2022-08-02 DIAGNOSIS — S61.211A LACERATION OF LEFT INDEX FINGER WITHOUT FOREIGN BODY WITHOUT DAMAGE TO NAIL, INITIAL ENCOUNTER: Primary | ICD-10-CM

## 2022-08-02 PROCEDURE — 90715 TDAP VACCINE 7 YRS/> IM: CPT | Performed by: EMERGENCY MEDICINE

## 2022-08-02 PROCEDURE — 90471 IMMUNIZATION ADMIN: CPT | Performed by: EMERGENCY MEDICINE

## 2022-08-02 PROCEDURE — 99282 EMERGENCY DEPT VISIT SF MDM: CPT

## 2022-08-02 PROCEDURE — 25010000002 TETANUS-DIPHTH-ACELL PERTUSSIS 5-2.5-18.5 LF-MCG/0.5 SUSPENSION PREFILLED SYRINGE: Performed by: EMERGENCY MEDICINE

## 2022-08-02 RX ADMIN — TETANUS TOXOID, REDUCED DIPHTHERIA TOXOID AND ACELLULAR PERTUSSIS VACCINE, ADSORBED 0.5 ML: 5; 2.5; 8; 8; 2.5 SUSPENSION INTRAMUSCULAR at 12:25

## 2022-08-02 NOTE — ED PROVIDER NOTES
"Subjective   History of Present Illness  67-year-old female presents after cutting left index finger while slicing avocado this morning.  She presents with wound to the finger.  She is right-hand dominant.  She has no complaints of new numbness or weakness.  She states she has MS and does have some sensory issues.  Review of Systems  Right-hand-dominant  Past Medical History:   Diagnosis Date   • Colon polyp    • Elevated cholesterol    • Encounter for Zostavax administration    • H/O bladder infections    • History of hepatitis A vaccination 2018   • History of lymphopenia     due ro Lemtrada   • Hyperlipidemia    • Hypertension    • Meningitis 1969   • Multiple sclerosis (HCC)    • Neuromuscular disorder (HCC)    • Optic neuritis    • Sleep apnea 09/01/2016    Bi-pap machine   • Spinal stenosis        Allergies   Allergen Reactions   • Amlodipine Besylate Swelling   • Losartan Rash   • Lisinopril Cough       Past Surgical History:   Procedure Laterality Date   • BREAST BIOPSY Right 1998    benign   • CATARACT EXTRACTION, BILATERAL  03/2019   • COLONOSCOPY      Unsure of date \"Don't Remember.\"   • COLONOSCOPY N/A 11/9/2021    Procedure: COLONOSCOPY to cecum with polypectomy and biopsies (cold bx, cliip);  Surgeon: Pardeep Aponte MD;  Location: [facility] ENDOSCOPY;  Service: General;  Laterality: N/A;  pre - change in bowel habits  post - polyps   • HIP BIPOLAR REPLACEMENT Left 01/2014    Dr Stern   • LAMINOTOMY      L-3-L-4-L-5 Dr Menezes   • OTHER SURGICAL HISTORY      multiple SCLEROSIS plaque   • OTHER SURGICAL HISTORY      [facility]INGROMA-2007 [facility], [place]   • SPINE SURGERY      minimally invasive spine surgery- Dr Torres       Family History   Problem Relation Age of Onset   • Cancer Mother         breast   • Alzheimer's disease Mother    • Arthritis Mother    • Other Father    • Arthritis Father    • Hypertension Father    • Hypertension Other    • COPD Other        Social History     Socioeconomic " History   • Marital status:    Tobacco Use   • Smoking status: Former Smoker     Packs/day: 0.25     Years: 5.00     Pack years: 1.25     Types: Cigarettes   • Smokeless tobacco: Never Used   • Tobacco comment: quit at age 28   Vaping Use   • Vaping Use: Never used   Substance and Sexual Activity   • Alcohol use: Yes     Alcohol/week: 4.0 - 5.0 standard drinks     Types: 4 - 5 Standard drinks or equivalent per week     Comment: socially   • Drug use: Yes     Types: Marijuana   • Sexual activity: Yes     Partners: Female     Birth control/protection: None     Comment: For 6-7 years, 20 years ago     Prior to Admission medications    Medication Sig Start Date End Date Taking? Authorizing Provider   atorvastatin (LIPITOR) 20 MG tablet TAKE 1 TABLET BY MOUTH DAILY 11/15/21   Gretta Canada MD   Cholecalciferol (VITAMIN D3) 2000 units tablet Daily. 7/9/18   ProviderSunny MD   diphenoxylate-atropine (LOMOTIL) 2.5-0.025 MG per tablet TAKE 1 TABLET BY MOUTH FOUR TIMES DAILY AS NEEDED FOR DIARRHEA 10/5/21   Gretta Canada MD   meloxicam (MOBIC) 15 MG tablet TAKE 1 TABLET BY MOUTH DAILY 3/16/22   Gretta Canada MD   Multiple Vitamins-Minerals (MULTIVITAMIN WOMEN 50+ PO) Take 1 tablet by mouth Every Night.    ProviderSunny MD   nebivolol (BYSTOLIC) 20 MG tablet TAKE 1 TABLET DAILY 3/6/22   Gretta Canada MD           Objective   Physical Exam  67-year-old female awake alert.  Examination of right hand reveals 1 and half centimeter laceration over radial aspect of middle phalanx.  She has intact range of motion.  She does have decreased two-point discrimination but this is similar on unaffected fingers.  She reports no new subjective numbness.  Laceration Repair    Date/Time: 8/2/2022 12:09 PM  Performed by: Jorge Peña MD  Authorized by: Jorge Peña MD     Consent:     Consent obtained:  Verbal    Risks discussed:  Infection  Universal protocol:     Procedure explained and  "questions answered to patient or proxy's satisfaction: yes      Patient identity confirmed:  Verbally with patient  Anesthesia:     Anesthesia method:  Nerve block    Block anesthetic:  Lidocaine 1% w/o epi    Block injection procedure:  Anatomic landmarks identified, introduced needle, incremental injection and negative aspiration for blood    Block outcome:  Anesthesia achieved  Laceration details:     Location:  Finger    Finger location:  L index finger    Length (cm):  1.5  Pre-procedure details:     Preparation:  Patient was prepped and draped in usual sterile fashion  Exploration:     Wound exploration: entire depth of wound visualized      Contaminated: no    Treatment:     Area cleansed with:  Chlorhexidine    Amount of cleaning:  Standard    Irrigation solution:  Sterile saline  Skin repair:     Repair method:  Sutures    Suture size:  5-0    Suture material:  Nylon    Suture technique:  Simple interrupted  Approximation:     Approximation:  Close  Post-procedure details:     Dressing:  Antibiotic ointment and sterile dressing  Comments:      Wound was explored and wound was noted to have remained superficial               ED Course              No radiology results for the last day  Medications   Tetanus-Diphth-Acell Pertussis (BOOSTRIX) injection 0.5 mL (has no administration in time range)     /50   Pulse 67   Temp 97.3 °F (36.3 °C) (Temporal)   Resp 18   Ht 167.6 cm (66\")   Wt 90.7 kg (200 lb)   SpO2 98%   BMI 32.28 kg/m²                                     MDM  Patient's findings were discussed with her.  Her tetanus immunization was updated.  She was discharged with usual wound care instructions advised to have sutures removed in 7 days to follow-up with prior provider return new or worsening symptoms  Final diagnoses:   Laceration of left index finger without foreign body without damage to nail, initial encounter       ED Disposition  ED Disposition     ED Disposition   Discharge    " Condition   Stable    Comment   --             Gretta Canada MD  [address] [facility] [address]  [place] [facility]  [phone]    In 1 week  For suture removal         Medication List      No changes were made to your prescriptions during this visit.          Jorge Peña MD  08/02/22 [id number]

## 2022-08-02 NOTE — DISCHARGE INSTRUCTIONS
Keep wound clean and dry.  May clean wound with peroxide and water twice a day if needed.  Apply antibiotic.  Watch for infection return as needed

## 2022-08-10 ENCOUNTER — OFFICE VISIT (OUTPATIENT)
Dept: FAMILY MEDICINE CLINIC | Facility: CLINIC | Age: 67
End: 2022-08-10

## 2022-08-10 VITALS
OXYGEN SATURATION: 96 % | DIASTOLIC BLOOD PRESSURE: 69 MMHG | TEMPERATURE: 97.5 F | SYSTOLIC BLOOD PRESSURE: 136 MMHG | HEART RATE: 62 BPM

## 2022-08-10 DIAGNOSIS — S61.211D LACERATION OF LEFT INDEX FINGER WITHOUT FOREIGN BODY WITHOUT DAMAGE TO NAIL, SUBSEQUENT ENCOUNTER: Primary | ICD-10-CM

## 2022-08-10 PROCEDURE — 99212 OFFICE O/P EST SF 10 MIN: CPT | Performed by: FAMILY MEDICINE

## 2022-08-10 NOTE — PROGRESS NOTES
"Answers for HPI/ROS submitted by the patient on 8/4/2022  Please describe your symptoms.: Have stitches removed from L index finger  Have you had these symptoms before?: No  How long have you been having these symptoms?: 1-4 days  Please describe any probable cause for these symptoms. : I was trying to cut an avocado and the knife slipped and cut my finger requiring a trip to the ER and 4 stitches  What is the primary reason for your visit?: Other    Chief Complaint  Suture / Staple Removal (Left index finger )    Subjective        Radha Ballard presents to [facility]  Sutures in left index finger placed in the ER on 8/2/22.  Healing well.      Objective   Vital Signs:  /69 (BP Location: Right arm, Patient Position: Sitting, Cuff Size: Small Adult)   Pulse 62   Temp 97.5 °F (36.4 °C) (Infrared)   SpO2 96%   Estimated body mass index is 32.28 kg/m² as calculated from the following:    Height as of 8/2/22: 167.6 cm (66\").    Weight as of 8/2/22: 90.7 kg (200 lb).          Physical Exam  Vitals reviewed.   Constitutional:       Appearance: Normal appearance.   Skin:     General: Skin is warm and dry.      Comments: Well-healed laceration of left lateral index finger.  Sutures removed without difficulty.   Neurological:      Mental Status: She is alert.        Result Review :         Suture Removal    Date/Time: 8/10/2022 1:04 PM  Performed by: Gretta Canada MD  Authorized by: Gretta Canada MD   Body area: upper extremity  Location details: left index finger  Wound Appearance: clean  Sutures Removed: 4  Post-removal: dressing applied  Patient tolerance: patient tolerated the procedure well with no immediate complications            Assessment and Plan   Diagnoses and all orders for this visit:    1. Laceration of left index finger without foreign body without damage to nail, subsequent encounter (Primary)  -     Suture Removal             Follow Up   No follow-ups " on file.  Patient was given instructions and counseling regarding her condition or for health maintenance advice. Please see specific information pulled into the AVS if appropriate.

## 2022-08-29 ENCOUNTER — TRANSCRIBE ORDERS (OUTPATIENT)
Dept: PHYSICAL THERAPY | Facility: CLINIC | Age: 67
End: 2022-08-29

## 2022-08-29 DIAGNOSIS — R29.898 LEG WEAKNESS, BILATERAL: Primary | ICD-10-CM

## 2022-08-31 ENCOUNTER — LAB (OUTPATIENT)
Dept: FAMILY MEDICINE CLINIC | Facility: CLINIC | Age: 67
End: 2022-08-31

## 2022-08-31 ENCOUNTER — OFFICE VISIT (OUTPATIENT)
Dept: FAMILY MEDICINE CLINIC | Facility: CLINIC | Age: 67
End: 2022-08-31

## 2022-08-31 VITALS
DIASTOLIC BLOOD PRESSURE: 74 MMHG | TEMPERATURE: 98.5 F | SYSTOLIC BLOOD PRESSURE: 136 MMHG | OXYGEN SATURATION: 96 % | HEART RATE: 60 BPM

## 2022-08-31 DIAGNOSIS — Z11.59 NEED FOR HEPATITIS C SCREENING TEST: ICD-10-CM

## 2022-08-31 DIAGNOSIS — Z12.4 SCREENING FOR MALIGNANT NEOPLASM OF CERVIX: ICD-10-CM

## 2022-08-31 DIAGNOSIS — Z00.00 MEDICARE ANNUAL WELLNESS VISIT, SUBSEQUENT: ICD-10-CM

## 2022-08-31 DIAGNOSIS — E78.5 HYPERLIPIDEMIA, UNSPECIFIED HYPERLIPIDEMIA TYPE: ICD-10-CM

## 2022-08-31 DIAGNOSIS — D25.9 UTERINE LEIOMYOMA, UNSPECIFIED LOCATION: ICD-10-CM

## 2022-08-31 DIAGNOSIS — Z00.00 MEDICARE ANNUAL WELLNESS VISIT, SUBSEQUENT: Primary | ICD-10-CM

## 2022-08-31 LAB
ALBUMIN SERPL-MCNC: 4 G/DL (ref 3.5–5.2)
ALBUMIN/GLOB SERPL: 1.7 G/DL
ALP SERPL-CCNC: 99 U/L (ref 39–117)
ALT SERPL W P-5'-P-CCNC: 26 U/L (ref 1–33)
ANION GAP SERPL CALCULATED.3IONS-SCNC: 9.3 MMOL/L (ref 5–15)
AST SERPL-CCNC: 18 U/L (ref 1–32)
BILIRUB SERPL-MCNC: 0.3 MG/DL (ref 0–1.2)
BUN SERPL-MCNC: 16 MG/DL (ref 8–23)
BUN/CREAT SERPL: 20 (ref 7–25)
CALCIUM SPEC-SCNC: 9.2 MG/DL (ref 8.6–10.5)
CHLORIDE SERPL-SCNC: 106 MMOL/L (ref 98–107)
CHOLEST SERPL-MCNC: 171 MG/DL (ref 0–200)
CO2 SERPL-SCNC: 26.7 MMOL/L (ref 22–29)
CREAT SERPL-MCNC: 0.8 MG/DL (ref 0.57–1)
EGFRCR SERPLBLD CKD-EPI 2021: 81.4 ML/MIN/1.73
GLOBULIN UR ELPH-MCNC: 2.3 GM/DL
GLUCOSE SERPL-MCNC: 114 MG/DL (ref 65–99)
HCV AB SER DONR QL: NORMAL
HDLC SERPL-MCNC: 64 MG/DL (ref 40–60)
LDLC SERPL CALC-MCNC: 86 MG/DL (ref 0–100)
LDLC/HDLC SERPL: 1.3 {RATIO}
POTASSIUM SERPL-SCNC: 4.3 MMOL/L (ref 3.5–5.2)
PROT SERPL-MCNC: 6.3 G/DL (ref 6–8.5)
SODIUM SERPL-SCNC: 142 MMOL/L (ref 136–145)
TRIGL SERPL-MCNC: 119 MG/DL (ref 0–150)
VLDLC SERPL-MCNC: 21 MG/DL (ref 5–40)

## 2022-08-31 PROCEDURE — 80061 LIPID PANEL: CPT | Performed by: FAMILY MEDICINE

## 2022-08-31 PROCEDURE — 86803 HEPATITIS C AB TEST: CPT | Performed by: FAMILY MEDICINE

## 2022-08-31 PROCEDURE — 1170F FXNL STATUS ASSESSED: CPT | Performed by: FAMILY MEDICINE

## 2022-08-31 PROCEDURE — 1159F MED LIST DOCD IN RCRD: CPT | Performed by: FAMILY MEDICINE

## 2022-08-31 PROCEDURE — 80053 COMPREHEN METABOLIC PANEL: CPT | Performed by: FAMILY MEDICINE

## 2022-08-31 PROCEDURE — G0439 PPPS, SUBSEQ VISIT: HCPCS | Performed by: FAMILY MEDICINE

## 2022-08-31 PROCEDURE — 36415 COLL VENOUS BLD VENIPUNCTURE: CPT | Performed by: FAMILY MEDICINE

## 2022-08-31 RX ORDER — MULTIPLE VITAMINS W/ MINERALS TAB 9MG-400MCG
TAB ORAL
COMMUNITY

## 2022-08-31 NOTE — PROGRESS NOTES
The ABCs of the Annual Wellness Visit  Subsequent Medicare Wellness Visit    Chief Complaint   Patient presents with   • Medicare Wellness-subsequent     Yearly    • Gynecologic Exam     Yearly       Subjective    History of Present Illness:  Radha Ballard is a 67 y.o. female who presents for a Subsequent Medicare Wellness Visit. She recently had some MRIs done of her brain and spine to follow up on MS.  The MRI made note of uterine fibroids.  Her LMP was with menopause at about age 51.  She has had no further bleeding.  No pelvic pain but she does have problems emptying her bladder at times.  This could be related to MS but could also be from enlarged uterus pressing on her bladder.     The following portions of the patient's history were reviewed and   updated as appropriate: allergies, current medications, past family history, past medical history, past social history, past surgical history and problem list.    Compared to one year ago, the patient feels her physical   health is the same.    Compared to one year ago, the patient feels her mental   health is the same.    Recent Hospitalizations:  She was not admitted to the hospital during the last year.       Current Medical Providers:  Patient Care Team:  Gretta Canada MD as PCP - General (Family Medicine)  Juana Cooley MD (Neurology)    Outpatient Medications Prior to Visit   Medication Sig Dispense Refill   • multivitamin with minerals (CENTRUM SILVER 50+WOMEN PO)      • atorvastatin (LIPITOR) 20 MG tablet TAKE 1 TABLET BY MOUTH DAILY 90 tablet 3   • Cholecalciferol (VITAMIN D3) 2000 units tablet Daily.     • Cholecalciferol 10 MCG (400 UNIT) capsule      • meloxicam (MOBIC) 15 MG tablet TAKE 1 TABLET BY MOUTH DAILY 90 tablet 1   • Multiple Vitamins-Minerals (MULTIVITAMIN WOMEN 50+ PO) Take 1 tablet by mouth Every Night.     • nebivolol (BYSTOLIC) 20 MG tablet TAKE 1 TABLET DAILY 90 tablet 3   • diphenoxylate-atropine (LOMOTIL) 2.5-0.025 MG  "per tablet TAKE 1 TABLET BY MOUTH FOUR TIMES DAILY AS NEEDED FOR DIARRHEA 120 tablet 0     No facility-administered medications prior to visit.       No opioid medication identified on active medication list. I have reviewed chart for other potential  high risk medication/s and harmful drug interactions in the elderly.          Aspirin is not on active medication list.  Aspirin use is not indicated based on review of current medical condition/s. Risk of harm outweighs potential benefits.  .    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   • Hip pain, right   • Hypertension   • Lymphocytopenia   • Multiple sclerosis (HCC)   • Neuritis   • Osteoarthritis of right hip   • Atopic dermatitis   • Other screening mammogram   • Postmenopausal status   • Scoliosis of lumbar spine   • Encounter for general adult medical examination without abnormal findings   • Status post right hip replacement   • Acute pain of left knee   • Acute cystitis with hematuria   • Elevated cholesterol   • Acute metabolic encephalopathy   • Functional diarrhea   • Medicare annual wellness visit, subsequent   • Hyperlipidemia   • Uterine leiomyoma     Advance Care Planning  Advance Directive is not on file.  ACP discussion was held with the patient during this visit. Patient does not have an advance directive, information provided.          Objective    Vitals:    08/31/22 1022   BP: 136/74   BP Location: Left arm   Patient Position: Sitting   Cuff Size: Large Adult   Pulse: 60   Temp: 98.5 °F (36.9 °C)   TempSrc: Infrared   SpO2: 96%   Weight: Comment: pt refused     Estimated body mass index is 32.28 kg/m² as calculated from the following:    Height as of 8/2/22: 167.6 cm (66\").    Weight as of 8/2/22: 90.7 kg (200 lb).    BMI is >= 30 and <35. (Class 1 Obesity). The following options were offered after discussion;: exercise counseling/recommendations      Does the patient have " evidence of cognitive impairment? No    Physical Exam  Vitals and nursing note reviewed.   Constitutional:       General: She is not in acute distress.     Appearance: Normal appearance. She is well-developed.      Comments: Uses power chair   HENT:      Head: Normocephalic.   Eyes:      General: Lids are normal.      Conjunctiva/sclera: Conjunctivae normal.   Neck:      Thyroid: No thyroid mass or thyromegaly.      Trachea: Trachea normal.   Cardiovascular:      Rate and Rhythm: Normal rate and regular rhythm.      Heart sounds: Normal heart sounds.   Pulmonary:      Effort: Pulmonary effort is normal.      Breath sounds: Normal breath sounds.   Chest:      Chest wall: No mass.   Breasts:      Right: Normal.      Left: Normal.       Abdominal:      Palpations: Abdomen is soft.   Genitourinary:     General: Normal vulva.      Exam position: Lithotomy position.      Labia:         Right: No lesion.         Left: No lesion.       Vagina: Normal.      Cervix: Normal.      Uterus: Enlarged.    Musculoskeletal:      Cervical back: Normal range of motion.   Lymphadenopathy:      Cervical: No cervical adenopathy.   Skin:     General: Skin is warm and dry.   Neurological:      Mental Status: She is alert and oriented to person, place, and time. Mental status is at baseline.      Motor: Weakness present.   Psychiatric:         Attention and Perception: She is attentive.         Mood and Affect: Mood normal.         Speech: Speech normal.         Behavior: Behavior normal.                 HEALTH RISK ASSESSMENT    Smoking Status:  Social History     Tobacco Use   Smoking Status Former Smoker   • Packs/day: 0.25   • Years: 5.00   • Pack years: 1.25   • Types: Cigarettes   Smokeless Tobacco Never Used   Tobacco Comment    quit at age 28     Alcohol Consumption:  Social History     Substance and Sexual Activity   Alcohol Use Not Currently    Comment: socially     Fall Risk Screen:    STEADI Fall Risk Assessment has not been  completed.    Depression Screening:  PHQ-2/PHQ-9 Depression Screening 8/31/2022   Retired Total Score -   Little Interest or Pleasure in Doing Things 0-->not at all   Feeling Down, Depressed or Hopeless 0-->not at all   PHQ-9: Brief Depression Severity Measure Score 0       Health Habits and Functional and Cognitive Screening:  Functional & Cognitive Status 8/31/2022   Do you have difficulty preparing food and eating? No   Do you have difficulty bathing yourself, getting dressed or grooming yourself? No   Do you have difficulty using the toilet? No   Do you have difficulty moving around from place to place? No   Do you have trouble with steps or getting out of a bed or a chair? Yes   Current Diet Well Balanced Diet   Dental Exam Up to date   Eye Exam Up to date   Exercise (times per week) 3 times per week   Current Exercises Include Swimming;Stationary Bicycling/Spin Class   Do you need help using the phone?  No   Are you deaf or do you have serious difficulty hearing?  No   Do you need help with transportation? No   Do you need help shopping? No   Do you need help preparing meals?  No   Do you need help with housework?  No   Do you need help with laundry? No   Do you need help taking your medications? No   Do you ever drive or ride in a car without wearing a seat belt? -   Have you felt unusual stress, anger or loneliness in the last month? No   Who do you live with? Spouse   If you need help, do you have trouble finding someone available to you? No   Do you have difficulty concentrating, remembering or making decisions? No       Age-appropriate Screening Schedule:  Refer to the list below for future screening recommendations based on patient's age, sex and/or medical conditions. Orders for these recommended tests are listed in the plan section. The patient has been provided with a written plan.    Health Maintenance   Topic Date Due   • ZOSTER VACCINE (2 of 3) 11/12/2015   • LIPID PANEL  10/27/2021   • INFLUENZA  VACCINE  10/01/2022   • DXA SCAN  12/10/2022   • MAMMOGRAM  12/15/2023   • PAP SMEAR  08/31/2025   • TDAP/TD VACCINES (2 - Td or Tdap) 08/02/2032              Assessment & Plan   CMS Preventative Services Quick Reference  Risk Factors Identified During Encounter  uterine fibroid - will refer to gynecology  The above risks/problems have been discussed with the patient.  Follow up actions/plans if indicated are seen below in the Assessment/Plan Section.  Pertinent information has been shared with the patient in the After Visit Summary.    Diagnoses and all orders for this visit:    1. Medicare annual wellness visit, subsequent (Primary)  -     IGP,Aptima HPV,Age Gdln; Future    2. Hyperlipidemia, unspecified hyperlipidemia type  -     Comprehensive Metabolic Panel  -     Lipid Panel    3. Need for hepatitis C screening test  -     Hepatitis C Antibody; Future    4. Uterine leiomyoma, unspecified location  -     Ambulatory Referral to Obstetrics / Gynecology    5. Screening for malignant neoplasm of cervix  -     IGP,Aptima HPV,Age Gdln; Future        Follow Up:   Return in about 1 year (around 8/31/2023) for Medicare Wellness.     An After Visit Summary and PPPS were made available to the patient.

## 2022-08-31 NOTE — PATIENT INSTRUCTIONS
Medicare Wellness  Personal Prevention Plan of Service     Date of Office Visit:    Encounter Provider:  Gretta Canada MD  Place of Service:  [facility]  Patient Name: Radha Ballard  :  1955    As part of the Medicare Wellness portion of your visit today, we are providing you with this personalized preventive plan of services (PPPS). This plan is based upon recommendations of the [place] Preventive Services Task Force (USPSTF) and the Advisory Committee on Immunization Practices (ACIP).    This lists the preventive care services that should be considered, and provides dates of when you are due. Items listed as completed are up-to-date and do not require any further intervention.    Health Maintenance   Topic Date Due    ZOSTER VACCINE (2 of 3) 2015    HEPATITIS C SCREENING  Never done    LIPID PANEL  10/27/2021    INFLUENZA VACCINE  10/01/2022    Pneumococcal Vaccine 65+ (2 - PPSV23 or PCV20) 10/08/2022    DXA SCAN  12/10/2022    ANNUAL WELLNESS VISIT  2023    MAMMOGRAM  12/15/2023    PAP SMEAR  2025    COLORECTAL CANCER SCREENING  2031    TDAP/TD VACCINES (2 - Td or Tdap) 2032    COVID-19 Vaccine  Completed       Orders Placed This Encounter   Procedures    Comprehensive Metabolic Panel     Order Specific Question:   Release to patient     Answer:   Routine Release    Lipid Panel    Hepatitis C Antibody     Standing Status:   Future     Standing Expiration Date:   2023     Order Specific Question:   Release to patient     Answer:   Routine Release    Ambulatory Referral to Obstetrics / Gynecology     Referral Priority:   Routine     Referral Type:   Consultation     Referral Reason:   Specialty Services Required     Referred to Provider:   Lianne Mosquera MD     Requested Specialty:   Obstetrics and Gynecology     Number of Visits Requested:   1       Return in about 1 year (around 2023) for Medicare  Wellness.

## 2022-09-02 RX ORDER — MELOXICAM 15 MG/1
15 TABLET ORAL DAILY
Qty: 90 TABLET | Refills: 1 | Status: SHIPPED | OUTPATIENT
Start: 2022-09-02 | End: 2023-02-20

## 2022-09-07 LAB
AGE GDLN ACOG TESTING: NORMAL
CYTOLOGIST CVX/VAG CYTO: NORMAL
CYTOLOGY CVX/VAG DOC CYTO: NORMAL
CYTOLOGY CVX/VAG DOC THIN PREP: NORMAL
DX ICD CODE: NORMAL
HIV 1 & 2 AB SER-IMP: NORMAL
OTHER STN SPEC: NORMAL
STAT OF ADQ CVX/VAG CYTO-IMP: NORMAL

## 2022-09-20 ENCOUNTER — OFFICE VISIT (OUTPATIENT)
Dept: FAMILY MEDICINE CLINIC | Facility: CLINIC | Age: 67
End: 2022-09-20

## 2022-09-20 VITALS
SYSTOLIC BLOOD PRESSURE: 126 MMHG | HEART RATE: 59 BPM | TEMPERATURE: 98 F | DIASTOLIC BLOOD PRESSURE: 73 MMHG | OXYGEN SATURATION: 96 %

## 2022-09-20 DIAGNOSIS — B35.4 TINEA CORPORIS: Primary | ICD-10-CM

## 2022-09-20 PROCEDURE — 99213 OFFICE O/P EST LOW 20 MIN: CPT | Performed by: FAMILY MEDICINE

## 2022-09-20 RX ORDER — NYSTATIN 100000 U/G
1 OINTMENT TOPICAL 2 TIMES DAILY
Qty: 30 G | Refills: 1 | Status: SHIPPED | OUTPATIENT
Start: 2022-09-20

## 2022-09-20 NOTE — PROGRESS NOTES
"Answers for HPI/ROS submitted by the patient on 9/19/2022  What is the primary reason for your visit?: Rash    Chief Complaint  Rash (On breast and stomach area. X 1 week )    Subjective        Radha Ballard presents to [facility]  Rash  This is a new problem. The current episode started in the past 7 days. The problem has been gradually worsening since onset. The affected locations include the chest and abdomen. She was exposed to nothing. Pertinent negatives include no anorexia, congestion, cough, diarrhea, eye pain, facial edema, fatigue, fever, joint pain, nail changes, rhinorrhea, shortness of breath, sore throat or vomiting. Past treatments include nothing. The treatment provided no relief.       Objective   Vital Signs:  /73 (BP Location: Left arm, Patient Position: Sitting, Cuff Size: Small Adult)   Pulse 59   Temp 98 °F (36.7 °C) (Infrared)   SpO2 96%   Estimated body mass index is 32.28 kg/m² as calculated from the following:    Height as of 8/2/22: 167.6 cm (66\").    Weight as of 8/2/22: 90.7 kg (200 lb).          Physical Exam  Vitals and nursing note reviewed.   Constitutional:       Appearance: Normal appearance.   Cardiovascular:      Rate and Rhythm: Regular rhythm.      Heart sounds: Normal heart sounds.   Skin:     General: Skin is warm.      Findings: Rash present.      Comments: Multiple 1-2 cm areas of rash on breasts and upper abdomen, circular with central clearing and peripheral redness    Neurological:      Mental Status: She is alert.        Result Review :                Assessment and Plan   Diagnoses and all orders for this visit:    1. Tinea corporis (Primary)  -     nystatin (MYCOSTATIN) 083388 UNIT/GM ointment; Apply 1 application topically to the appropriate area as directed 2 (Two) Times a Day.  Dispense: 30 g; Refill: 1             Follow Up   No follow-ups on file.  Patient was given instructions and counseling regarding her condition " or for health maintenance advice. Please see specific information pulled into the AVS if appropriate.

## 2022-10-13 ENCOUNTER — TREATMENT (OUTPATIENT)
Dept: PHYSICAL THERAPY | Facility: CLINIC | Age: 67
End: 2022-10-13

## 2022-10-13 DIAGNOSIS — R29.898 BILATERAL LEG WEAKNESS: Primary | ICD-10-CM

## 2022-10-13 DIAGNOSIS — R29.898 WEAKNESS OF BOTH HIPS: ICD-10-CM

## 2022-10-13 PROCEDURE — 97110 THERAPEUTIC EXERCISES: CPT | Performed by: PHYSICAL THERAPIST

## 2022-10-13 PROCEDURE — 97162 PT EVAL MOD COMPLEX 30 MIN: CPT | Performed by: PHYSICAL THERAPIST

## 2022-10-13 NOTE — PROGRESS NOTES
Physical Therapy Initial Evaluation and Plan of Care    Patient: Radha Ballard   : 1955  Diagnosis/ICD-10 Code:  Bilateral leg weakness [R29.898]  Referring practitioner: Juana Cooley MD  Date of Initial Visit: 10/13/2022  Today's Date: 10/13/2022  Patient seen for 1 sessions           Subjective Questionnaire: ABC: 12      Subjective Evaluation    History of Present Illness  Onset date: 6 years ago since getting power WC.  Mechanism of injury: MS and non-weightbearing status for 6 years    Subjective comment: Pt reports to OP PT with B LE weakness that progressively declined since 2016 when she got the power wheel chair which she is in 100% of the time other than sleeping and bathroom transfers.    Patient Occupation: retired Quality of life: excellent    Pain  Current pain ratin  At best pain ratin  At worst pain ratin  Location: right side of lower back  Quality: sharp and tight  Relieving factors: change in position  Aggravating factors: prolonged positioning    Social Support  Lives in: one-story house  Lives with: spouse    Hand dominance: right    Diagnostic Tests  MRI studies: abnormal (see yellow sticky note)    Treatments  Previous treatment: physical therapy  Current treatment: physical therapy  Patient Goals  Patient goals for therapy: decreased edema, decreased pain, increased strength, independence with ADLs/IADLs, increased motion and improved balance  Patient goal: increase LE strength to transfer easier and maintain independence and lose weight     MRI interpretation:  1. Cord signal abnormality is seen at T8-T9, T10 and T11. No corresponding pathologic contrast enhancement is demonstrated.   2. No significant myelomalacia.   3. Mild multilevel degenerative changes of the thoracic spine.   4. Mild-to-moderate dextro scoliosis.   5. At T7-T8 there is a central disc extrusion which extends superiorly. This is not impinging upon the cord.   6. Enlarged incompletely  imaged uterus. There appear to be multiple fibroids.        Precautions: MS, sleep apnea with CPAP, R hip dislocated easily and frequently    Objective          Neurological Testing     Sensation     Hip   Left Hip   Intact: light touch    Right Hip   Intact: light touch    Passive Range of Motion   Left Hip   Flexion: WFL  External rotation (90/90): WFL  Internal rotation (90/90): WFL    Right Hip   Flexion: WFL  External rotation (90/90): WFL  Internal rotation (90/90): Right hip passive internal rotation 90/90: limit due to unstable hip joint.     Strength/Myotome Testing     Left Hip   Planes of Motion   Flexion: 4-  Abduction: 4-  Adduction: 4-    Right Hip   Planes of Motion   Flexion: 3  Abduction: 3+  Adduction: 3+    Additional Strength Details  Ankle DF, inversion and eversion 4/5 L and 2+/5 DF, 3-/5 inversion and eversion R    Knee flexion and extension 4-/5 L and 4-/5 extension and 3+/5 flexion R      Left Hip Flexibility Comments:   Hamstring tightness to about 30 degrees of flexion in 90/90 test (modified in WC)    Right Hip Flexibility Comments:   Fair hs felxibility      See Exercise, Manual, and Modality Logs for complete treatment.     Assessment & Plan     Assessment  Impairments: abnormal coordination, abnormal gait, abnormal muscle firing, abnormal muscle tone, abnormal or restricted ROM, activity intolerance, impaired balance, impaired physical strength, lacks appropriate home exercise program, safety issue and weight-bearing intolerance  Functional Limitations: walking, moving in bed, standing and unable to perform repetitive tasks  Assessment details: The patient is a 67 y.o. female who presents to physical therapy today for B LE weakness. Upon initial evaluation, the patient demonstrates the following impairments: generalized LE (R>L) weakness, inability to weight bear or balance, standing intolerance, gait and transfer/movement quality deficits. Due to these impairments, the patient is  unable to walk, stand and balance while performing ADLs. The patient would benefit from skilled PT services to address functional limitations and impairments and to improve patient quality of life.      Barriers to therapy: MS and easily dislocating R hip  Prognosis: fair    Goals  Plan Goals: STGs (4 weeks):  1.  Pt will be able to verbalize understanding of HEP.  2.  Pt will be able to stand for 2 minutes without UE support to allow safe and effective performance of ADLs.  3.  Pt will demonstrate 4/5 B hip extension, abduction and flexion.    LTGs (12 weeks):    1.  Pt will be I with finalized HEP.  2.  Pt will be able to stand unsupported for 3 minutes while performing UE movements to simulate putting away dishes and brushing teeth.    3.  Pt will demonstrate 4+/5 B hip abduction and extension and B ankle DF and eversion.      Plan  Therapy options: will be seen for skilled therapy services  Planned modality interventions: dry needling, TENS, electrical stimulation/Russian stimulation, thermotherapy (hydrocollator packs), ultrasound and cryotherapy  Planned therapy interventions: abdominal trunk stabilization, ADL retraining, balance/weight-bearing training, body mechanics training, flexibility, functional ROM exercises, gait training, home exercise program, IADL retraining, manual therapy, motor coordination training, neuromuscular re-education, postural training, soft tissue mobilization, strengthening, stretching, therapeutic activities and transfer training  Frequency: 2x week  Duration in weeks: 12  Treatment plan discussed with: patient        Visit Diagnoses:    ICD-10-CM ICD-9-CM   1. Bilateral leg weakness  R29.898 729.89       History # of Personal Factors and/or Comorbidities: HIGH (3+)  Examination of Body System(s): # of elements: MODERATE (3)  Clinical Presentation: UNSTABLE   Clinical Decision Making: Moderate      Timed:         Manual Therapy:         mins  79701;     Therapeutic Exercise:    15      mins  33301;     Neuromuscular Shelby:        mins  86541;    Therapeutic Activity:          mins  36460;     Gait Training:           mins  00308;     Ultrasound:          mins  70260;    Ionto                                   mins   25501  Self Care                            mins   17845  Canalith Repos         mins 25282      Un-Timed:  Electrical Stimulation:         mins  25113 ( );  Dry Needling         mins self-pay  Traction          mins 85647  Low Eval          Mins  96386  Mod Eval     30     Mins  93964  High Eval                            Mins  40550  Re-Eval                               mins  38842    Timed Treatment:   15   mins   Total Treatment:     45   mins    PT SIGNATURE: Mary Michaels PT         Initial Certification  Certification Period: 10/13/2022 thru 1/11/2023  I certify that the therapy services are furnished while this patient is under my care.  The services outlined above are required by this patient, and will be reviewed every 90 days.     PHYSICIAN: Juana Cooley MD      DATE:     Please sign and return via fax to [phone].. Thank you, [facility].

## 2022-10-20 ENCOUNTER — TREATMENT (OUTPATIENT)
Dept: PHYSICAL THERAPY | Facility: CLINIC | Age: 67
End: 2022-10-20

## 2022-10-20 DIAGNOSIS — R29.898 WEAKNESS OF BOTH HIPS: ICD-10-CM

## 2022-10-20 DIAGNOSIS — R29.898 BILATERAL LEG WEAKNESS: Primary | ICD-10-CM

## 2022-10-20 PROCEDURE — 97112 NEUROMUSCULAR REEDUCATION: CPT | Performed by: PHYSICAL THERAPIST

## 2022-10-20 PROCEDURE — 97530 THERAPEUTIC ACTIVITIES: CPT | Performed by: PHYSICAL THERAPIST

## 2022-10-20 PROCEDURE — 97110 THERAPEUTIC EXERCISES: CPT | Performed by: PHYSICAL THERAPIST

## 2022-10-20 NOTE — PROGRESS NOTES
Physical Therapy Daily Treatment Note      Patient: Radha Ballard   : 1955  Referring practitioner: Juana Cooley MD  Date of Initial Visit: Type: THERAPY  Noted: 10/13/2022  Today's Date: 10/20/2022    VISIT#: 2          Subjective   Radha Ballard reports: doing HEP daily with greater ease now.      Objective   See Exercise, Manual, and Modality Logs for complete treatment.       Assessment & Plan     Assessment    Assessment details: Reviewed and progressed HEP with handouts and cues provided for proper form and execution.  Focus on mm endurance and standing tolerance and LE/hip strengthening in future.  Pt fatigues easily and needs rest breaks.          Progress per Plan of Care and Progress strengthening /stabilization /functional activity           Timed:  Manual Therapy:         mins  67663;  Therapeutic Exercise:    15     mins  77985;     Neuromuscular Shelby:    15    mins  16949;    Therapeutic Activity:    10      mins  97779;     Gait Training:           mins  13760;     Ultrasound:          mins  05087;    Electrical Stimulation:        mins  47685 ( );    Untimed:  Electrical Stimulation:         mins  99521 ( );  Mechanical Traction:         mins  95023;   Dry needling:       Self pay    Timed Treatment:   40   mins   Total Treatment:     40   mins  Mary Michaels, PT, DPT, CLT, CIDN  Physical Therapist

## 2022-10-24 ENCOUNTER — TELEPHONE (OUTPATIENT)
Dept: PHYSICAL THERAPY | Facility: CLINIC | Age: 67
End: 2022-10-24

## 2022-10-26 ENCOUNTER — TREATMENT (OUTPATIENT)
Dept: PHYSICAL THERAPY | Facility: CLINIC | Age: 67
End: 2022-10-26

## 2022-10-26 DIAGNOSIS — R29.898 BILATERAL LEG WEAKNESS: Primary | ICD-10-CM

## 2022-10-26 DIAGNOSIS — R29.898 WEAKNESS OF BOTH HIPS: ICD-10-CM

## 2022-10-26 PROCEDURE — 97112 NEUROMUSCULAR REEDUCATION: CPT | Performed by: PHYSICAL THERAPIST

## 2022-10-26 PROCEDURE — 97110 THERAPEUTIC EXERCISES: CPT | Performed by: PHYSICAL THERAPIST

## 2022-10-26 PROCEDURE — 97530 THERAPEUTIC ACTIVITIES: CPT | Performed by: PHYSICAL THERAPIST

## 2022-10-26 NOTE — PROGRESS NOTES
Physical Therapy Daily Treatment Note     [facility]   [address]  [place] [address]    Patient:  Radha Ballard  :  1955  Referring practitioner:  Juana Cooley MD  Date of Initial Visit:  Type: THERAPY  Noted: 10/13/2022  Today's Date:  10/26/2022      Visit Diagnoses:    ICD-10-CM ICD-9-CM   1. Bilateral leg weakness  R29.898 729.89   2. Weakness of both hips  R29.898 729.89       VISIT#:  3 in POC.    Subjective   Radha Ballard reports she is doing well today.  Felt okay after her last therapy session with implementation of new exercises.      Objective   See exercise, manual, and modality logs for complete treatment.       ASSESSMENT  Mult seated rest breaks required throughout session per fatigue and per poor standing tolerance.  Pt tolerated new exercises / activities without problems.  Cues as noted.  Held selected exercises per time.  No complications today.      PLAN  Continue current POC and progress as tolerated per PT evaluation.      Timed:  Therapeutic Exercise:    15     mins  63084;     Neuromuscular Shelby:    17    mins  98594;    Therapeutic Activity:     13     mins  86029;        Timed Treatment:   45   mins   Total Treatment:     45   mins      Mo Silverman, PT  [place] License # [id number]  Physical Therapist

## 2022-10-31 ENCOUNTER — TREATMENT (OUTPATIENT)
Dept: PHYSICAL THERAPY | Facility: CLINIC | Age: 67
End: 2022-10-31

## 2022-10-31 DIAGNOSIS — R29.898 BILATERAL LEG WEAKNESS: Primary | ICD-10-CM

## 2022-10-31 DIAGNOSIS — R29.898 WEAKNESS OF BOTH HIPS: ICD-10-CM

## 2022-10-31 PROCEDURE — 97110 THERAPEUTIC EXERCISES: CPT | Performed by: PHYSICAL THERAPIST

## 2022-10-31 PROCEDURE — 97530 THERAPEUTIC ACTIVITIES: CPT | Performed by: PHYSICAL THERAPIST

## 2022-10-31 PROCEDURE — 97112 NEUROMUSCULAR REEDUCATION: CPT | Performed by: PHYSICAL THERAPIST

## 2022-10-31 NOTE — PROGRESS NOTES
Physical Therapy Daily Treatment Note      Patient: Radha Ballard   : 1955  Referring practitioner: Juana Cooley MD  Date of Initial Visit: Type: THERAPY  Noted: 10/13/2022  Today's Date: 10/31/2022    VISIT#: 4          Subjective   Radha Ballard reports: some thigh soreness after last session that lasted a day.  She is now able to stand and put away dishes.      Objective   See Exercise, Manual, and Modality Logs for complete treatment.       Assessment & Plan     Assessment    Assessment details: Pt has increased muscular endurance and strength as noted by increased weight and reps.  Her right hip joint still demonstrates instability and weakness in weightbearing, but she is able to maintain standing position longer periods before fatiguing.            Progress per Plan of Care and Progress strengthening /stabilization /functional activity           Timed:  Manual Therapy:         mins  90830;  Therapeutic Exercise:    15     mins  88735;     Neuromuscular Shelby:    8    mins  05513;    Therapeutic Activity:     15     mins  41034;     Gait Training:           mins  92716;     Ultrasound:          mins  78347;    Electrical Stimulation:         mins  45303 ( );    Untimed:  Electrical Stimulation:         mins  80418 ( );  Mechanical Traction:         mins  91277;   Dry needling:       Self pay    Timed Treatment:  38    mins   Total Treatment:     38   mins  Mary Michaels PT, DPT, CLT, LUPEN  Physical Therapist

## 2022-11-01 ENCOUNTER — OFFICE VISIT (OUTPATIENT)
Dept: FAMILY MEDICINE CLINIC | Facility: CLINIC | Age: 67
End: 2022-11-01

## 2022-11-01 VITALS
TEMPERATURE: 97.3 F | HEART RATE: 56 BPM | DIASTOLIC BLOOD PRESSURE: 60 MMHG | OXYGEN SATURATION: 98 % | SYSTOLIC BLOOD PRESSURE: 113 MMHG

## 2022-11-01 DIAGNOSIS — L82.1 SEBORRHEIC KERATOSIS: Primary | ICD-10-CM

## 2022-11-01 PROCEDURE — 99213 OFFICE O/P EST LOW 20 MIN: CPT | Performed by: FAMILY MEDICINE

## 2022-11-01 NOTE — PROGRESS NOTES
"Chief Complaint  Skin Problem (Spot on back and needs a referral to see a dermatologist )    Subjective        Radha Ballard presents to [facility]  Skin Problem  This is a new problem. The current episode started 1 to 4 weeks ago. The problem occurs constantly. The problem has been unchanged. Nothing aggravates the symptoms. She has tried nothing for the symptoms.       Objective   Vital Signs:  /60 (BP Location: Left arm, Patient Position: Sitting, Cuff Size: Large Adult)   Pulse 56   Temp 97.3 °F (36.3 °C) (Infrared)   SpO2 98%   Estimated body mass index is 32.28 kg/m² as calculated from the following:    Height as of 8/2/22: 167.6 cm (66\").    Weight as of 8/2/22: 90.7 kg (200 lb).    BMI is >= 30 and <35. (Class 1 Obesity). The following options were offered after discussion;: nutrition counseling/recommendations      Physical Exam  Vitals and nursing note reviewed.   Constitutional:       Appearance: Normal appearance.   Cardiovascular:      Rate and Rhythm: Regular rhythm.      Heart sounds: Normal heart sounds.   Musculoskeletal:      Cervical back: Neck supple.   Skin:     Findings: Lesion present.      Comments: Left mid back with typical seborrheic keratosis without bleeding or discharge   Neurological:      Mental Status: She is alert.        Result Review :  The following data was reviewed by: Gretta Canada MD on 11/01/2022:  Common labs    Common Labs 8/31/22 8/31/22    1114 1114   Glucose 114 (A)    BUN 16    Creatinine 0.80    Sodium 142    Potassium 4.3    Chloride 106    Calcium 9.2    Albumin 4.00    Total Bilirubin 0.3    Alkaline Phosphatase 99    AST (SGOT) 18    ALT (SGPT) 26    Total Cholesterol  171   Triglycerides  119   HDL Cholesterol  64 (A)   LDL Cholesterol   86   (A) Abnormal value                      Assessment and Plan   Diagnoses and all orders for this visit:    1. Seborrheic keratosis (Primary)  Assessment & " Plan:  Hydrocortisone cream otc 1% as needed for itch.             Follow Up   No follow-ups on file.  Patient was given instructions and counseling regarding her condition or for health maintenance advice. Please see specific information pulled into the AVS if appropriate.

## 2022-11-02 ENCOUNTER — TREATMENT (OUTPATIENT)
Dept: PHYSICAL THERAPY | Facility: CLINIC | Age: 67
End: 2022-11-02

## 2022-11-02 DIAGNOSIS — R29.898 BILATERAL LEG WEAKNESS: Primary | ICD-10-CM

## 2022-11-02 DIAGNOSIS — R29.898 WEAKNESS OF BOTH HIPS: ICD-10-CM

## 2022-11-02 PROCEDURE — 97530 THERAPEUTIC ACTIVITIES: CPT | Performed by: PHYSICAL THERAPIST

## 2022-11-02 PROCEDURE — 97110 THERAPEUTIC EXERCISES: CPT | Performed by: PHYSICAL THERAPIST

## 2022-11-02 PROCEDURE — 97112 NEUROMUSCULAR REEDUCATION: CPT | Performed by: PHYSICAL THERAPIST

## 2022-11-02 NOTE — PROGRESS NOTES
"Physical Therapy Daily Treatment Note      Patient: Radha Ballard   : 1955  Referring practitioner: Juana Cooley MD  Date of Initial Visit: Type: THERAPY  Noted: 10/13/2022  Today's Date: 2022    VISIT#: 5          Subjective   Radha Ballard reports: \"feeling worn out, like she had a good workout\"  Last session.  She states she was able to lay prone for 15 minutes twice since last visit and noticed decreased tension in the fronts of her thighs.      Objective   See Exercise, Manual, and Modality Logs for complete treatment.       Assessment & Plan     Assessment    Assessment details: Pt progressed reps today to improve mm endurance and tolerance.  Improved hamstring contraction and control in prone as well as knee alignment with sit to stands today.           Progress per Plan of Care and Progress strengthening /stabilization /functional activity           Timed:  Manual Therapy:        mins  58138;  Therapeutic Exercise:    20     mins  80951;     Neuromuscular Shelby:    10    mins  18109;    Therapeutic Activity:   15       mins  20718;     Gait Training:           mins  88359;     Ultrasound:          mins  67979;    Electrical Stimulation:         mins  58797 ( );    Untimed:  Electrical Stimulation:        mins  33185 ( );  Mechanical Traction:         mins  91284;   Dry needling:       Self pay    Timed Treatment:   45   mins   Total Treatment:     45   mins  Mary Michaels PT, DPT, CLT, CIDN  Physical Therapist  "

## 2022-11-07 ENCOUNTER — TREATMENT (OUTPATIENT)
Dept: PHYSICAL THERAPY | Facility: CLINIC | Age: 67
End: 2022-11-07

## 2022-11-07 DIAGNOSIS — R29.898 WEAKNESS OF BOTH HIPS: ICD-10-CM

## 2022-11-07 DIAGNOSIS — R29.898 BILATERAL LEG WEAKNESS: Primary | ICD-10-CM

## 2022-11-07 PROCEDURE — 97110 THERAPEUTIC EXERCISES: CPT | Performed by: PHYSICAL THERAPIST

## 2022-11-07 PROCEDURE — 97530 THERAPEUTIC ACTIVITIES: CPT | Performed by: PHYSICAL THERAPIST

## 2022-11-07 PROCEDURE — 97112 NEUROMUSCULAR REEDUCATION: CPT | Performed by: PHYSICAL THERAPIST

## 2022-11-07 NOTE — PROGRESS NOTES
Physical Therapy Daily Treatment Note    [facility], [address]  [place]  [address] [phone]      Patient: Radha Ballard   : 1955  Diagnosis/ICD-10 Code:  Bilateral leg weakness [R29.898]  Referring practitioner: Juana Cooley MD  Date of Initial Visit: 2022  Today's Date: 2022  Patient seen for 6 sessions.  POC 2x/wk x 12 weeks, exp 23        VISIT#: 6      Subjective :  No complaints.  PT sessions are not over fatiguing her.       Objective :  PATIENT 15 MINUTES LATE FOR TREATMENT    See Exercise, Manual, and Modality Logs for complete treatment.       Patient Education:      Assessment/Plan  Treatment abbreviated as patient late for session. Therapeutic rest needed for SOA.       Progress per Plan of Care            Timed:         Manual Therapy:         mins  30211;     Therapeutic Exercise:   20      mins  33621;     Neuromuscular Shelby:   15     mins  35851;    Therapeutic Activity:    10      mins  01282;     Gait Training:           mins  20386;     Ultrasound:          mins  02223;    Ionto                                   mins   51138  Self Care                            mins   48337  Aquatic                               mins 83945    Un-Timed:  Electrical Stimulation:         mins  83897 (MC );  Traction          mins 41360      Timed Treatment:  45    mins   Total Treatment:    45    mins    Madison Richards PTA  Physical Therapist Assistant   [place] license:  #[id number]

## 2022-11-09 ENCOUNTER — TREATMENT (OUTPATIENT)
Dept: PHYSICAL THERAPY | Facility: CLINIC | Age: 67
End: 2022-11-09

## 2022-11-09 DIAGNOSIS — R29.898 BILATERAL LEG WEAKNESS: Primary | ICD-10-CM

## 2022-11-09 DIAGNOSIS — R29.898 WEAKNESS OF BOTH HIPS: ICD-10-CM

## 2022-11-09 PROCEDURE — 97530 THERAPEUTIC ACTIVITIES: CPT | Performed by: PHYSICAL THERAPIST

## 2022-11-09 PROCEDURE — 97112 NEUROMUSCULAR REEDUCATION: CPT | Performed by: PHYSICAL THERAPIST

## 2022-11-09 PROCEDURE — 97110 THERAPEUTIC EXERCISES: CPT | Performed by: PHYSICAL THERAPIST

## 2022-11-09 RX ORDER — ATORVASTATIN CALCIUM 20 MG/1
TABLET, FILM COATED ORAL
Qty: 90 TABLET | Refills: 3 | Status: SHIPPED | OUTPATIENT
Start: 2022-11-09

## 2022-11-09 NOTE — PROGRESS NOTES
Physical Therapy Daily Treatment Note    [facility], [address]  [place]  [address] [phone]      Patient: Radha Ballard   : 1955  Diagnosis/ICD-10 Code:  Bilateral leg weakness [R29.898]  Referring practitioner: Juana Cooley MD  Date of Initial Visit: 2022  Today's Date: 2022  Patient seen for 7 sessions.  POC 2x/wk x 12 weeks, exp 23        VISIT#: 7      Subjective :  Patient reports that right hip pain started this morning, 4/10.  She put ice on it and took ibuprofen and pain is now 1/10.      Objective :      See Exercise, Manual, and Modality Logs for complete treatment.       Patient Education:      Assessment/Plan  :  Activity to tolerance due to right hip pain. Pt. Unable to perform all reps of some exercise due to pain.     Progress per Plan of Care            Timed:         Manual Therapy:         mins  04235;     Therapeutic Exercise:   20      mins  52130;     Neuromuscular Shelby:   15     mins  19142;    Therapeutic Activity:    10      mins  47695;     Gait Training:           mins  13340;     Ultrasound:          mins  49704;    Ionto                                   mins   20802  Self Care                            mins   53045  Aquatic                               mins 06386    Un-Timed:  Electrical Stimulation:         mins  86663 ( );  Traction          mins 00588      Timed Treatment:  45    mins   Total Treatment:    45    mins    Madison Richards PTA  Physical Therapist Assistant   [place] license:  #[id number]

## 2022-11-14 ENCOUNTER — TELEPHONE (OUTPATIENT)
Dept: PHYSICAL THERAPY | Facility: CLINIC | Age: 67
End: 2022-11-14

## 2022-11-28 ENCOUNTER — TREATMENT (OUTPATIENT)
Dept: PHYSICAL THERAPY | Facility: CLINIC | Age: 67
End: 2022-11-28

## 2022-11-28 DIAGNOSIS — R29.898 WEAKNESS OF BOTH HIPS: ICD-10-CM

## 2022-11-28 DIAGNOSIS — R29.898 BILATERAL LEG WEAKNESS: Primary | ICD-10-CM

## 2022-11-28 PROCEDURE — 97530 THERAPEUTIC ACTIVITIES: CPT | Performed by: PHYSICAL THERAPIST

## 2022-11-28 PROCEDURE — 97112 NEUROMUSCULAR REEDUCATION: CPT | Performed by: PHYSICAL THERAPIST

## 2022-11-28 PROCEDURE — 97110 THERAPEUTIC EXERCISES: CPT | Performed by: PHYSICAL THERAPIST

## 2022-11-28 NOTE — PROGRESS NOTES
Physical Therapy Daily Treatment Note      Patient: Radha Ballard   : 1955  Referring practitioner: Juana Cooley MD  Date of Initial Visit: Type: THERAPY  Noted: 10/13/2022  Today's Date: 2022    VISIT#: 8          Subjective   Radha Ballard reports: performing her HEP at least once/day, if not twice. She was sick with flu for a week and is a bit weaker/stiffer today.     Objective   See Exercise, Manual, and Modality Logs for complete treatment.       Assessment & Plan     Assessment    Assessment details: Pt needed some more rest breaks today due to weakness from recovering from flu for a week.            Progress per Plan of Care and Progress strengthening /stabilization /functional activity           Timed:  Manual Therapy:         mins  11967;  Therapeutic Exercise:    15     mins  31108;     Neuromuscular Shelby:    8    mins  31512;    Therapeutic Activity:     15     mins  59518;     Gait Training:           mins  35470;     Ultrasound:          mins  45191;    Electrical Stimulation:         mins  89373 ( );    Untimed:  Electrical Stimulation:         mins  47691 ( );  Mechanical Traction:         mins  37630;   Dry needling:       Self pay    Timed Treatment:   38   mins   Total Treatment:     38   mins  Mary Michaels PT, DPT, CLT, LUPEN  Physical Therapist

## 2022-12-02 ENCOUNTER — TELEPHONE (OUTPATIENT)
Dept: PHYSICAL THERAPY | Facility: CLINIC | Age: 67
End: 2022-12-02

## 2022-12-06 ENCOUNTER — TREATMENT (OUTPATIENT)
Dept: PHYSICAL THERAPY | Facility: CLINIC | Age: 67
End: 2022-12-06

## 2022-12-06 DIAGNOSIS — R29.898 WEAKNESS OF BOTH HIPS: ICD-10-CM

## 2022-12-06 DIAGNOSIS — R29.898 BILATERAL LEG WEAKNESS: Primary | ICD-10-CM

## 2022-12-06 PROCEDURE — 97530 THERAPEUTIC ACTIVITIES: CPT | Performed by: PHYSICAL THERAPIST

## 2022-12-06 PROCEDURE — 97110 THERAPEUTIC EXERCISES: CPT | Performed by: PHYSICAL THERAPIST

## 2022-12-06 PROCEDURE — 97112 NEUROMUSCULAR REEDUCATION: CPT | Performed by: PHYSICAL THERAPIST

## 2022-12-06 NOTE — PROGRESS NOTES
Physical Therapy Daily Treatment Note    [facility], [address]  [place]  [address]  [phone]      Patient: Radha Ballard   : 1955  Diagnosis/ICD-10 Code:  Bilateral leg weakness [R29.898]  Referring practitioner: Juana Cooley MD  Date of Initial Visit: 2022  Today's Date: 2022  Patient seen for 9 sessions.  POC 2x/wk x 12 weeks, exp 23        VISIT#: 9      Subjective :  Pt. Doing well.  No complaints of pain.  No changes.       Objective :      See Exercise, Manual, and Modality Logs for complete treatment.       Patient Education:      Assessment/Plan  : Cueing for proper posture when upright. Endurance good with minimal rest breaks required.     Progress per Plan of Care            Timed:         Manual Therapy:         mins  50386;     Therapeutic Exercise:   20      mins  30971;     Neuromuscular Shelby:   15     mins  88410;    Therapeutic Activity:    10      mins  90742;     Gait Training:           mins  19056;     Ultrasound:          mins  97675;    Ionto                                   mins   64490  Self Care                            mins   54396  Aquatic                               mins 70046    Un-Timed:  Electrical Stimulation:         mins  10194 ( );  Traction          mins 88469      Timed Treatment:  45    mins   Total Treatment:    45    mins    Madison Richards PTA  Physical Therapist Assistant   [place] license:  #[id number]

## 2022-12-13 ENCOUNTER — TREATMENT (OUTPATIENT)
Dept: PHYSICAL THERAPY | Facility: CLINIC | Age: 67
End: 2022-12-13

## 2022-12-13 DIAGNOSIS — R29.898 BILATERAL LEG WEAKNESS: Primary | ICD-10-CM

## 2022-12-13 DIAGNOSIS — R29.898 WEAKNESS OF BOTH HIPS: ICD-10-CM

## 2022-12-13 PROCEDURE — 97530 THERAPEUTIC ACTIVITIES: CPT | Performed by: PHYSICAL THERAPIST

## 2022-12-13 PROCEDURE — 97110 THERAPEUTIC EXERCISES: CPT | Performed by: PHYSICAL THERAPIST

## 2022-12-13 PROCEDURE — 97140 MANUAL THERAPY 1/> REGIONS: CPT | Performed by: PHYSICAL THERAPIST

## 2022-12-13 NOTE — PROGRESS NOTES
Physical Therapy Progress  Note/30-Day Note      Patient: Radha Ballard   : 1955  Referring practitioner: Juana Cooley MD  Date of Initial Visit: Type: THERAPY  Noted: 10/13/2022  Today's Date: 2022    VISIT#: 10           Subjective   Radha Ballard reports: she is tired today from cleaning and no pain today.     Objective          Neurological Testing     Sensation     Hip   Left Hip   Intact: light touch    Right Hip   Intact: light touch    Passive Range of Motion   Left Hip   Flexion: WFL  External rotation (90/90): WFL  Internal rotation (90/90): WFL    Right Hip   Flexion: WFL  External rotation (90/90): WFL  Internal rotation (90/90): Right hip passive internal rotation 90/90: limit due to unstable hip joint.     Strength/Myotome Testing     Left Hip   Planes of Motion   Flexion: 4  Extension: 3+  Abduction: 4+ (seated)  Adduction: 4 (seated)    Right Hip   Planes of Motion   Flexion: 3+  Extension: 3  Abduction: 4 (seated)  Adduction: 4 (sitting)    Additional Strength Details  Ankle DF and ankle inversion 4+/5 and eversion 4/5 L and 3+/5 DF, 3+/5 inversion and eversion R    Knee flexion and extension 4+/5 L and 4+/5 extension and 4/5 flexion R      Left Hip Flexibility Comments:   Hamstring tightness to about 10 degrees of flexion in 90/90 test (modified in WC)    Right Hip Flexibility Comments:   Lacking 15 degrees of knee extension in 90/90 hip       See Exercise, Manual, and Modality Logs for complete treatment.       Assessment & Plan     Assessment    Assessment details: Pt reports 70% overall improvement, noting she is standing more and able to get in and out of the car easier and stand to put away dishes with greater ease.  She notes more strength and flexibility.  She still feels her right LE is weak, especially with lifting her leg while in the shower chair to wash her foot.      Goals  Plan Goals:  STGs (4 weeks):  1.  Pt will be able to verbalize understanding of HEP.   MET  2.  Pt will be able to stand for 2 minutes without UE support to allow safe and effective performance of ADLs.  PROGRESSING, at 1 minute  3.  Pt will demonstrate 4/5 B hip extension, abduction and flexion.  PROGRESSING    LTGs (12 weeks):    1.  Pt will be I with finalized HEP.  2.  Pt will be able to stand unsupported for 3 minutes while performing UE movements to simulate putting away dishes and brushing teeth.    3.  Pt will demonstrate 4+/5 B hip abduction and extension and B ankle DF and eversion.          Plan  Therapy options: will be seen for skilled therapy services          Progress per Plan of Care and Progress strengthening /stabilization /functional activity           Timed:  Manual Therapy:    8     mins  67584;  Therapeutic Exercise:    15     mins  00597;     Neuromuscular Shelby:        mins  27020;    Therapeutic Activity:     15     mins  37276;     Gait Training:           mins  76173;     Ultrasound:          mins  32405;    Electrical Stimulation:         mins  73355 ( );    Untimed:  Electrical Stimulation:         mins  66008 ( );  Mechanical Traction:         mins  95160;   Dry needling:       Self pay    Timed Treatment:   38   mins   Total Treatment:     38   mins  Mary Michaels PT, DPT, CLT, CIDN  Physical Therapist

## 2022-12-15 ENCOUNTER — TELEPHONE (OUTPATIENT)
Dept: PHYSICAL THERAPY | Facility: CLINIC | Age: 67
End: 2022-12-15

## 2022-12-20 RX ORDER — NEBIVOLOL 20 MG/1
TABLET ORAL
Qty: 90 TABLET | Refills: 3 | Status: SHIPPED | OUTPATIENT
Start: 2022-12-20

## 2022-12-21 ENCOUNTER — TREATMENT (OUTPATIENT)
Dept: PHYSICAL THERAPY | Facility: CLINIC | Age: 67
End: 2022-12-21

## 2022-12-21 DIAGNOSIS — R29.898 BILATERAL LEG WEAKNESS: Primary | ICD-10-CM

## 2022-12-21 DIAGNOSIS — R29.898 WEAKNESS OF BOTH HIPS: ICD-10-CM

## 2022-12-21 PROCEDURE — 97112 NEUROMUSCULAR REEDUCATION: CPT | Performed by: PHYSICAL THERAPIST

## 2022-12-21 PROCEDURE — 97530 THERAPEUTIC ACTIVITIES: CPT | Performed by: PHYSICAL THERAPIST

## 2022-12-21 PROCEDURE — 97110 THERAPEUTIC EXERCISES: CPT | Performed by: PHYSICAL THERAPIST

## 2022-12-22 ENCOUNTER — TELEPHONE (OUTPATIENT)
Dept: PHYSICAL THERAPY | Facility: CLINIC | Age: 67
End: 2022-12-22

## 2022-12-28 ENCOUNTER — TREATMENT (OUTPATIENT)
Dept: PHYSICAL THERAPY | Facility: CLINIC | Age: 67
End: 2022-12-28

## 2022-12-28 DIAGNOSIS — R29.898 BILATERAL LEG WEAKNESS: Primary | ICD-10-CM

## 2022-12-28 DIAGNOSIS — R29.898 WEAKNESS OF BOTH HIPS: ICD-10-CM

## 2022-12-28 PROCEDURE — 97530 THERAPEUTIC ACTIVITIES: CPT | Performed by: PHYSICAL THERAPIST

## 2022-12-28 PROCEDURE — 97110 THERAPEUTIC EXERCISES: CPT | Performed by: PHYSICAL THERAPIST

## 2022-12-28 PROCEDURE — 97140 MANUAL THERAPY 1/> REGIONS: CPT | Performed by: PHYSICAL THERAPIST

## 2022-12-28 NOTE — PROGRESS NOTES
Physical Therapy Daily Treatment Note      Patient: Radha Ballard   : 1955  Referring practitioner: Juana Cooley MD  Date of Initial Visit: Type: THERAPY  Noted: 10/13/2022  Today's Date: 2022    VISIT#: 12          Subjective   Radha Ballard reports: she was able to stand the entire time to put clothes in the dryer from the top-loading washer.  She has not been able to do this for about 2 years.      Objective   See Exercise, Manual, and Modality Logs for complete treatment.       Assessment & Plan     Assessment    Assessment details: Pt fatigued easily today with standing tolerance and needed frequent rest breaks due to working out in the pool this morning.  Prone exercises performed with manual stretching for hip flexors.            Progress per Plan of Care and Progress strengthening /stabilization /functional activity           Timed:  Manual Therapy:    8     mins  20553;  Therapeutic Exercise:    15     mins  86615;     Neuromuscular Shelby:        mins  61313;    Therapeutic Activity:     15     mins  43333;     Gait Training:           mins  62656;     Ultrasound:          mins  19138;    Electrical Stimulation:         mins  16653 ( );    Untimed:  Electrical Stimulation:         mins  72275 ( );  Mechanical Traction:         mins  23673;   Dry needling:       Self pay    Timed Treatment:   38   mins   Total Treatment:     38   mins  Mary Michaels PT, DPT, CLT, LUPEN  Physical Therapist

## 2022-12-30 ENCOUNTER — TREATMENT (OUTPATIENT)
Dept: PHYSICAL THERAPY | Facility: CLINIC | Age: 67
End: 2022-12-30

## 2022-12-30 DIAGNOSIS — R29.898 WEAKNESS OF BOTH HIPS: ICD-10-CM

## 2022-12-30 DIAGNOSIS — R29.898 BILATERAL LEG WEAKNESS: Primary | ICD-10-CM

## 2022-12-30 PROCEDURE — 97530 THERAPEUTIC ACTIVITIES: CPT | Performed by: PHYSICAL THERAPIST

## 2022-12-30 PROCEDURE — 97112 NEUROMUSCULAR REEDUCATION: CPT | Performed by: PHYSICAL THERAPIST

## 2022-12-30 PROCEDURE — 97110 THERAPEUTIC EXERCISES: CPT | Performed by: PHYSICAL THERAPIST

## 2022-12-30 NOTE — PROGRESS NOTES
Physical Therapy Daily Treatment Note    [facility], [address]  [place]  [address]  [phone]      Patient: Radha Ballard   : 1955  Diagnosis/ICD-10 Code:  Bilateral leg weakness [R29.898]  Referring practitioner: Juana Cooley MD  Date of Initial Visit: 2022  Today's Date: 2022  Patient seen for 13 sessions.  POC 2x/wk x 12 weeks, exp 23        VISIT#: 13      Subjective :  Pt. Doing well.  No pain.   Pt. To talk to neurologist when she sees her in March regarding AFO.       Objective :      See Exercise, Manual, and Modality Logs for complete treatment.       Patient Education:      Assessment/Plan   : Frequent therapuetic rest due to fatigue and SOA.   Hard to maintain erect posture during gait, relies on weight bearing through walker.       Progress per Plan of Care            Timed:         Manual Therapy:         mins  99768;     Therapeutic Exercise:   20      mins  62109;     Neuromuscular Shelby:   15     mins  48297;    Therapeutic Activity:    10      mins  94558;     Gait Training:           mins  05245;     Ultrasound:          mins  50128;    Ionto                                   mins   51622  Self Care                            mins   01028  Aquatic                               mins 12840    Un-Timed:  Electrical Stimulation:         mins  17271 ( );  Traction          mins 84527      Timed Treatment:  45    mins   Total Treatment:    45    mins    Madison Richards PTA  Physical Therapist Assistant   [place] license:  #[id number]

## 2023-01-04 ENCOUNTER — TREATMENT (OUTPATIENT)
Dept: PHYSICAL THERAPY | Facility: CLINIC | Age: 68
End: 2023-01-04
Payer: MEDICARE

## 2023-01-04 DIAGNOSIS — R29.898 WEAKNESS OF BOTH HIPS: ICD-10-CM

## 2023-01-04 DIAGNOSIS — R29.898 BILATERAL LEG WEAKNESS: Primary | ICD-10-CM

## 2023-01-04 PROCEDURE — 97112 NEUROMUSCULAR REEDUCATION: CPT | Performed by: PHYSICAL THERAPIST

## 2023-01-04 PROCEDURE — 97110 THERAPEUTIC EXERCISES: CPT | Performed by: PHYSICAL THERAPIST

## 2023-01-04 PROCEDURE — 97530 THERAPEUTIC ACTIVITIES: CPT | Performed by: PHYSICAL THERAPIST

## 2023-01-04 NOTE — PROGRESS NOTES
Physical Therapy Daily Treatment Note    [address], [address]  [place]  [address] [phone]      Patient: Radha Ballard   : 1955  Diagnosis/ICD-10 Code:  Bilateral leg weakness [R29.898]  Referring practitioner: Juana Cooley MD  Date of Initial Visit: 2023  Today's Date: 2023  Patient seen for 14 sessions.  POC 2x/wk x 12 weeks, exp 23        VISIT#: 14      Subjective : No complaints.  She feels that LE stretching helps.       Objective :      See Exercise, Manual, and Modality Logs for complete treatment.       Patient Education:      Assessment/Plan   :Pt. Gait slightly improved with less right foot drag noted.       Progress per Plan of Care            Timed:         Manual Therapy:         mins  01320;     Therapeutic Exercise:   20      mins  96738;     Neuromuscular Shelby:   15     mins  89884;    Therapeutic Activity:    10      mins  54645;     Gait Training:           mins  97038;     Ultrasound:          mins  84580;    Ionto                                   mins   11103  Self Care                            mins   20816  Aquatic                               mins 53918    Un-Timed:  Electrical Stimulation:         mins  99460 ( );  Traction          mins 12502      Timed Treatment:  45    mins   Total Treatment:    45    mins    Madison Richards PTA  Physical Therapist Assistant   [place] license:  #[id number]

## 2023-01-09 ENCOUNTER — TREATMENT (OUTPATIENT)
Dept: PHYSICAL THERAPY | Facility: CLINIC | Age: 68
End: 2023-01-09
Payer: MEDICARE

## 2023-01-09 DIAGNOSIS — R29.898 BILATERAL LEG WEAKNESS: Primary | ICD-10-CM

## 2023-01-09 DIAGNOSIS — R29.898 WEAKNESS OF BOTH HIPS: ICD-10-CM

## 2023-01-09 PROCEDURE — 97110 THERAPEUTIC EXERCISES: CPT | Performed by: PHYSICAL THERAPIST

## 2023-01-09 PROCEDURE — 97112 NEUROMUSCULAR REEDUCATION: CPT | Performed by: PHYSICAL THERAPIST

## 2023-01-09 PROCEDURE — 97530 THERAPEUTIC ACTIVITIES: CPT | Performed by: PHYSICAL THERAPIST

## 2023-01-09 NOTE — PROGRESS NOTES
Physical Therapy Re Certification Of Plan of Care  Patient: Radha Ballard   : 1955  Diagnosis/ICD-10 Code:  Bilateral leg weakness [R29.898]  Referring practitioner: Juana Cooley MD  Date of Initial Visit: Type: THERAPY  Noted: 10/13/2022  Today's Date: 2023  Patient seen for 15 sessions         Visit Diagnoses:    ICD-10-CM ICD-9-CM   1. Bilateral leg weakness  R29.898 729.89   2. Weakness of both hips  R29.898 729.89         Radha Ballard reports: 50% overall improvement since starting therapy, noting improved ability and duration of standing to put dishes away and get things out of the refrigerator.    Subjective Questionnaire: ABC: 43%    Clinical Progress: improved  Home Program Compliance: Yes  Treatment has included: therapeutic exercise, neuromuscular re-education, manual therapy, therapeutic activity and gait training      Subjective       Objective          Neurological Testing     Sensation     Hip   Left Hip   Intact: light touch    Right Hip   Diminished: light touch    Passive Range of Motion   Left Hip   Flexion: WFL  External rotation (90/90): WFL  Internal rotation (90/90): WFL    Right Hip   Flexion: WFL  External rotation (90/90): WFL  Internal rotation (90/90): Right hip passive internal rotation 90/90: limit due to unstable hip joint.     Strength/Myotome Testing     Left Hip   Planes of Motion   Flexion: 4  Extension: 3+  Abduction: 4+ (seated)  Adduction: 4 (seated)    Right Hip   Planes of Motion   Flexion: 3+  Extension: 3  Abduction: 3+ (supine)  Adduction: 4 (sitting)    Additional Strength Details              Assessment & Plan       Goals  Plan Goals: Plan Goals:  STGs (4 weeks):  1.  Pt will be able to verbalize understanding of HEP.  MET  2.  Pt will be able to stand for 2 minutes without UE support to allow safe and effective performance of ADLs. MET  3.  Pt will demonstrate 4/5 B hip extension, abduction and flexion.  PROGRESSING    LTGs (12 weeks):    1.   Pt will be I with finalized HEP.  2.  Pt will be able to stand unsupported for 3 minutes while performing UE movements to simulate putting away dishes and brushing teeth.    3.  Pt will demonstrate 4+/5 B hip abduction and extension and B ankle DF and eversion.          Plan  Therapy options: will be seen for skilled therapy services           Recommendations: Continue as planned  Timeframe: 1 week  Prognosis to achieve goals: good      Timed:         Manual Therapy:     3    mins  38213;     Therapeutic Exercise:    15     mins  59252;     Neuromuscular Shelby:    15    mins  64319;    Therapeutic Activity:     12     mins  72259;     Gait Training:           mins  29882;     Ultrasound:          mins  03288;    Ionto                                   mins   42116  Self Care                            mins   85389  Canalith Repos         mins 74370      Un-Timed:  Electrical Stimulation:         mins  56025 ( );  Dry Needling          mins self-pay  Traction          mins 83423  Re-Eval                              mins  41649      Timed Treatment:   45   mins   Total Treatment:     45   mins          PT: Mary Michaels PT       Electronically signed by Mary Michaels PT, 01/09/23, 2:56 PM EST    Certification Period: 1/9/2023 thru 4/8/2023  I certify that the therapy services are furnished while this patient is under my care.  The services outlined above are required by this patient, and will be reviewed every 90 days.         Physician Signature:__________________________________________________    PHYSICIAN: Juana Cooley MD  NPI: [id number]                                      DATE:  :     Please sign and return via fax to [email or web address] . Thank you, [facility]

## 2023-01-13 ENCOUNTER — TELEPHONE (OUTPATIENT)
Dept: PHYSICAL THERAPY | Facility: CLINIC | Age: 68
End: 2023-01-13

## 2023-01-16 ENCOUNTER — TREATMENT (OUTPATIENT)
Dept: PHYSICAL THERAPY | Facility: CLINIC | Age: 68
End: 2023-01-16
Payer: MEDICARE

## 2023-01-16 DIAGNOSIS — R29.898 BILATERAL LEG WEAKNESS: Primary | ICD-10-CM

## 2023-01-16 DIAGNOSIS — R29.898 WEAKNESS OF BOTH HIPS: ICD-10-CM

## 2023-01-16 PROCEDURE — 97530 THERAPEUTIC ACTIVITIES: CPT | Performed by: PHYSICAL THERAPIST

## 2023-01-16 PROCEDURE — 97110 THERAPEUTIC EXERCISES: CPT | Performed by: PHYSICAL THERAPIST

## 2023-01-16 PROCEDURE — 97112 NEUROMUSCULAR REEDUCATION: CPT | Performed by: PHYSICAL THERAPIST

## 2023-01-16 NOTE — PROGRESS NOTES
Physical Therapy Daily Treatment Note      Patient: Radha Ballard   : 1955  Referring practitioner: Juana Cooley MD  Date of Initial Visit: Type: THERAPY  Noted: 10/13/2022  Today's Date: 2023    VISIT#: 16          Subjective   Radha Ballard reports: she is able to stand for about a minute at home while putting dishes away with 1 hand on the counter.      Objective   See Exercise, Manual, and Modality Logs for complete treatment.       Assessment & Plan     Assessment    Assessment details: Pt fatigues easily when standing and needs frequent rest breaks.            Progress per Plan of Care and Progress strengthening /stabilization /functional activity           Timed:  Manual Therapy:         mins  69663;  Therapeutic Exercise:    15     mins  33308;     Neuromuscular Shelby:    15    mins  13662;    Therapeutic Activity:     15     mins  74362;     Gait Training:           mins  97285;     Ultrasound:          mins  21387;    Electrical Stimulation:         mins  15705 ( );    Untimed:  Electrical Stimulation:         mins  14008 ( );  Mechanical Traction:         mins  48635;   Dry needling:       Self pay    Timed Treatment:   45   mins   Total Treatment:     45   mins  Mary Michaels PT, DPT, CLT, CIDN  Physical Therapist

## 2023-01-23 ENCOUNTER — TREATMENT (OUTPATIENT)
Dept: PHYSICAL THERAPY | Facility: CLINIC | Age: 68
End: 2023-01-23
Payer: MEDICARE

## 2023-01-23 DIAGNOSIS — R29.898 BILATERAL LEG WEAKNESS: Primary | ICD-10-CM

## 2023-01-23 DIAGNOSIS — R29.898 WEAKNESS OF BOTH HIPS: ICD-10-CM

## 2023-01-23 PROCEDURE — 97112 NEUROMUSCULAR REEDUCATION: CPT | Performed by: PHYSICAL THERAPIST

## 2023-01-23 PROCEDURE — 97110 THERAPEUTIC EXERCISES: CPT | Performed by: PHYSICAL THERAPIST

## 2023-01-23 PROCEDURE — 97140 MANUAL THERAPY 1/> REGIONS: CPT | Performed by: PHYSICAL THERAPIST

## 2023-01-23 NOTE — PROGRESS NOTES
Physical Therapy Progress  Note/30-Day Note      Patient: Radha Ballard   : 1955  Referring practitioner: Juana Cooley MD  Date of Initial Visit: Type: THERAPY  Noted: 10/13/2022  Today's Date: 2023    VISIT#: 17          Subjective   Radha Ballard reports: 3/10 pain level in the right hip(posterior) today.      Objective          Neurological Testing     Sensation     Hip   Left Hip   Intact: light touch    Right Hip   Diminished: light touch    Passive Range of Motion   Left Hip   Flexion: WFL  External rotation (90/90): WFL  Internal rotation (90/90): WFL    Right Hip   Flexion: WFL  External rotation (90/90): WFL  Internal rotation (90/90): Right hip passive internal rotation 90/90: limit due to unstable hip joint.     Strength/Myotome Testing     Left Hip   Planes of Motion   Flexion: 4+  Abduction: 4+ (seated)  Adduction: 4+ (seated)    Right Hip   Planes of Motion   Flexion: 3+  Abduction: 3+ (supine)  Adduction: 4+ (sitting)    Additional Strength Details          See Exercise, Manual, and Modality Logs for complete treatment.       Assessment & Plan     Assessment    Assessment details: Pt has increased hip strength in some planes as noted in objective section.  She has improved flexibility and functional mobility.  She may continue to benefit from skilled therapy to address her remaining deficits and achieve LTGs.      Goals  Plan Goals: Plan Goals:  STGs (4 weeks):  1.  Pt will be able to verbalize understanding of HEP.  MET  2.  Pt will be able to stand for 2 minutes without UE support to allow safe and effective performance of ADLs. MET with UE support   3.  Pt will demonstrate 4/5 B hip extension, abduction and flexion.  PROGRESSING    LTGs (12 weeks):    1.  Pt will be I with finalized HEP.  2.  Pt will be able to stand unsupported for 3 minutes while performing UE movements to simulate putting away dishes and brushing teeth.    3.  Pt will demonstrate 4+/5 B hip abduction  and extension and B ankle DF and eversion.          Plan  Therapy options: will be seen for skilled therapy services          Progress per Plan of Care and Progress strengthening /stabilization /functional activity           Timed:  Manual Therapy:    8     mins  41294;  Therapeutic Exercise:    15     mins  61147;     Neuromuscular Shelby:    15    mins  75478;    Therapeutic Activity:     5     mins  55140;     Gait Training:           mins  85152;     Ultrasound:          mins  46075;    Electrical Stimulation:         mins  67145 ( );    Untimed:  Electrical Stimulation:         mins  73312 ( );  Mechanical Traction:         mins  72976;   Dry needling:       Self pay    Timed Treatment:   43   mins   Total Treatment:     43   mins  Mary Michaels PT, DPT, CLT, LUPEN  Physical Therapist

## 2023-01-30 ENCOUNTER — TREATMENT (OUTPATIENT)
Dept: PHYSICAL THERAPY | Facility: CLINIC | Age: 68
End: 2023-01-30
Payer: MEDICARE

## 2023-01-30 DIAGNOSIS — R29.898 BILATERAL LEG WEAKNESS: Primary | ICD-10-CM

## 2023-01-30 DIAGNOSIS — R29.898 WEAKNESS OF BOTH HIPS: ICD-10-CM

## 2023-01-30 PROCEDURE — 97112 NEUROMUSCULAR REEDUCATION: CPT | Performed by: PHYSICAL THERAPIST

## 2023-01-30 PROCEDURE — 97140 MANUAL THERAPY 1/> REGIONS: CPT | Performed by: PHYSICAL THERAPIST

## 2023-01-30 PROCEDURE — 97110 THERAPEUTIC EXERCISES: CPT | Performed by: PHYSICAL THERAPIST

## 2023-01-30 NOTE — PROGRESS NOTES
Physical Therapy Daily Treatment Note    [facility], [address]  [place]  [address] [phone]      Patient: Radha Ballard   : 1955  Diagnosis/ICD-10 Code:  Bilateral leg weakness [R29.898]  Referring practitioner: Juana Cooley MD  Date of Initial Visit: 2023  Today's Date: 2023  Patient seen for 18 sessions.  POC 2x/wk x 12 weeks, exp 23        VISIT#: 18      Subjective : Cold weather giving her some discomfort in right hip.        Objective :      See Exercise, Manual, and Modality Logs for complete treatment.       Patient Education:      Assessment/Plan   :  Pt. Walking longer distances with improved posture and control of right LE.       Progress per Plan of Care            Timed:         Manual Therapy:  10       mins  50491;     Therapeutic Exercise:   20      mins  35693;     Neuromuscular Shelby:    15    mins  25403;    Therapeutic Activity:          mins  39481;     Gait Training:           mins  74243;     Ultrasound:          mins  24814;    Ionto                                   mins   23521  Self Care                            mins   35612  Aquatic                               mins 43740    Un-Timed:  Electrical Stimulation:         mins  80058 ( );  Traction          mins 11004      Timed Treatment:  45    mins   Total Treatment:    45    mins    Madison Richards PTA  Physical Therapist Assistant   [place] license:  #[id number]

## 2023-02-06 ENCOUNTER — TREATMENT (OUTPATIENT)
Dept: PHYSICAL THERAPY | Facility: CLINIC | Age: 68
End: 2023-02-06
Payer: MEDICARE

## 2023-02-06 DIAGNOSIS — R29.898 BILATERAL LEG WEAKNESS: Primary | ICD-10-CM

## 2023-02-06 DIAGNOSIS — R29.898 WEAKNESS OF BOTH HIPS: ICD-10-CM

## 2023-02-06 PROCEDURE — 97112 NEUROMUSCULAR REEDUCATION: CPT | Performed by: PHYSICAL THERAPIST

## 2023-02-06 PROCEDURE — 97110 THERAPEUTIC EXERCISES: CPT | Performed by: PHYSICAL THERAPIST

## 2023-02-06 PROCEDURE — 97140 MANUAL THERAPY 1/> REGIONS: CPT | Performed by: PHYSICAL THERAPIST

## 2023-02-06 NOTE — PROGRESS NOTES
Physical Therapy Daily Treatment Note    [facility], [address]  [place]  [address]  [phone]      Patient: Radha Ballard   : 1955  Diagnosis/ICD-10 Code:  Bilateral leg weakness [R29.898]  Referring practitioner: Juana Cooley MD  Date of Initial Visit: 2023  Today's Date: 2023  Patient seen for 19 sessions.  POC 2x/wk x 12 weeks, exp 23        VISIT#: 19      Subjective : Patient still going to [facility] to use warm water pool and Bio step. She goes about 3x/wk.       Objective :      See Exercise, Manual, and Modality Logs for complete treatment.       Patient Education:      Assessment/Plan   :  Gait distance increased to 15 feet vs. 10 feet at a time.  Manual stretching beneficial, feels good and increased flexibility helps with functional activities.       Progress per Plan of Care            Timed:         Manual Therapy:  10       mins  20211;     Therapeutic Exercise:   20      mins  55281;     Neuromuscular Shelby:    10    mins  36901;    Therapeutic Activity:          mins  92796;     Gait Training:           mins  42152;     Ultrasound:          mins  50928;    Ionto                                   mins   94267  Self Care                            mins   05335  Aquatic                               mins 09570    Un-Timed:  Electrical Stimulation:         mins  91737 ( );  Traction          mins 61873      Timed Treatment:  40    mins   Total Treatment:    40    mins    Madison Richards PTA  Physical Therapist Assistant   [place] license:  #[id number]

## 2023-02-13 ENCOUNTER — TREATMENT (OUTPATIENT)
Dept: PHYSICAL THERAPY | Facility: CLINIC | Age: 68
End: 2023-02-13
Payer: MEDICARE

## 2023-02-13 DIAGNOSIS — R29.898 BILATERAL LEG WEAKNESS: Primary | ICD-10-CM

## 2023-02-13 DIAGNOSIS — R29.898 WEAKNESS OF BOTH HIPS: ICD-10-CM

## 2023-02-13 PROCEDURE — 97530 THERAPEUTIC ACTIVITIES: CPT | Performed by: PHYSICAL THERAPIST

## 2023-02-13 PROCEDURE — 97110 THERAPEUTIC EXERCISES: CPT | Performed by: PHYSICAL THERAPIST

## 2023-02-13 NOTE — PROGRESS NOTES
Physical Therapy Daily Treatment Note      Patient: Radha Ballard   : 1955  Referring practitioner: Juana Cooley MD  Date of Initial Visit: Type: THERAPY  Noted: 10/13/2022  Today's Date: 2023    VISIT#: 20          Subjective   Radha Balladr reports: standing is easier and she is able to perform this longer while putting dishes away.      Objective   See Exercise, Manual, and Modality Logs for complete treatment.       Assessment & Plan     Assessment    Assessment details: Manual stretching continues to provide improved function and ease of movement.  Pt able to ambulate almost 50ft with walker and SBA today, which is the most she has walked in the clinic at one time.             Progress per Plan of Care and Progress strengthening /stabilization /functional activity           Timed:  Manual Therapy:    8     mins  73604;  Therapeutic Exercise:    15     mins  65524;     Neuromuscular Shelby:        mins  03134;    Therapeutic Activity:    8      mins  62299;     Gait Training:           mins  21092;     Ultrasound:          mins  07887;    Electrical Stimulation:         mins  66838 ( );    Untimed:  Electrical Stimulation:         mins  19468 ( );  Mechanical Traction:         mins  46754;   Dry needling:       Self pay    Timed Treatment:   31 mins   Total Treatment:     31   mins  Mary Michaels PT, DPT, CLT, CIDN  Physical Therapist   yes

## 2023-02-20 ENCOUNTER — TREATMENT (OUTPATIENT)
Dept: PHYSICAL THERAPY | Facility: CLINIC | Age: 68
End: 2023-02-20
Payer: MEDICARE

## 2023-02-20 DIAGNOSIS — R29.898 WEAKNESS OF BOTH HIPS: ICD-10-CM

## 2023-02-20 DIAGNOSIS — R29.898 BILATERAL LEG WEAKNESS: Primary | ICD-10-CM

## 2023-02-20 PROCEDURE — 97140 MANUAL THERAPY 1/> REGIONS: CPT | Performed by: PHYSICAL THERAPIST

## 2023-02-20 PROCEDURE — 97110 THERAPEUTIC EXERCISES: CPT | Performed by: PHYSICAL THERAPIST

## 2023-02-20 RX ORDER — MELOXICAM 15 MG/1
15 TABLET ORAL DAILY
Qty: 90 TABLET | Refills: 1 | Status: SHIPPED | OUTPATIENT
Start: 2023-02-20

## 2023-02-20 NOTE — PROGRESS NOTES
Physical Therapy Daily Treatment Note    [facility], [address]  [place]  [address] [phone]      Patient: Radha Ballard   : 1955  Diagnosis/ICD-10 Code:  Bilateral leg weakness [R29.898]  Referring practitioner: Juana Cooley MD  Date of Initial Visit: 2023  Today's Date: 2023  Patient seen for 21 sessions.  POC 2x/wk x 12 weeks, exp 23        VISIT#: 21      Subjective : Patient having a hard day, she had to have dog put down due to pneumonia.        Objective :      See Exercise, Manual, and Modality Logs for complete treatment.       Patient Education:      Assessment/Plan :  Pt. Responding well to interventions.  She is highly motivated to improve functional mobility and strength.       Progress per Plan of Care            Timed:         Manual Therapy:  10       mins  54582;     Therapeutic Exercise:   20      mins  93364;     Neuromuscular Shelby:        mins  43510;    Therapeutic Activity:          mins  42810;     Gait Training:           mins  01530;     Ultrasound:          mins  15498;    Ionto                                   mins   90608  Self Care                            mins   39873  Aquatic                               mins 29170    Un-Timed:  Electrical Stimulation:         mins  40411 (MC );  Traction          mins 57399      Timed Treatment:  30    mins   Total Treatment:    30    mins    Madison Richards PTA  Physical Therapist Assistant   [place] license:  #[id number]

## 2023-02-27 ENCOUNTER — TREATMENT (OUTPATIENT)
Dept: PHYSICAL THERAPY | Facility: CLINIC | Age: 68
End: 2023-02-27
Payer: MEDICARE

## 2023-02-27 DIAGNOSIS — R29.898 WEAKNESS OF BOTH HIPS: ICD-10-CM

## 2023-02-27 DIAGNOSIS — R29.898 BILATERAL LEG WEAKNESS: Primary | ICD-10-CM

## 2023-02-27 PROCEDURE — 97110 THERAPEUTIC EXERCISES: CPT | Performed by: PHYSICAL THERAPIST

## 2023-02-27 PROCEDURE — 97140 MANUAL THERAPY 1/> REGIONS: CPT | Performed by: PHYSICAL THERAPIST

## 2023-02-27 NOTE — PROGRESS NOTES
Physical Therapy Daily Treatment Note    [facility], [address]  [place]  [address] [phone]      Patient: Radha Ballard   : 1955  Diagnosis/ICD-10 Code:  Bilateral leg weakness [R29.898]  Referring practitioner: Juana Cooley MD  Date of Initial Visit: 2023  Today's Date: 2023  Patient seen for 22 sessions.  POC 2x/wk x 12 weeks, exp 23        VISIT#: 22      Subjective : Pt. Saw neurologist who did not change anything. She will have full body scan in .       Objective :      See Exercise, Manual, and Modality Logs for complete treatment.       Patient Education:      Assessment/Plan :  Pt. Continues to improve functional mobility.       Progress per Plan of Care            Timed:         Manual Therapy:  10       mins  12913;     Therapeutic Exercise:   20      mins  06382;     Neuromuscular Shelby:        mins  82892;    Therapeutic Activity:          mins  90224;     Gait Training:           mins  52836;     Ultrasound:          mins  10370;    Ionto                                   mins   49093  Self Care                            mins   95742  Aquatic                               mins 23006    Un-Timed:  Electrical Stimulation:         mins  67826 ( );  Traction          mins 11429      Timed Treatment:  30    mins   Total Treatment:    30    mins    Madison Richards PTA  Physical Therapist Assistant   [place] license:  #[id number]

## 2023-03-06 ENCOUNTER — TREATMENT (OUTPATIENT)
Dept: PHYSICAL THERAPY | Facility: CLINIC | Age: 68
End: 2023-03-06
Payer: MEDICARE

## 2023-03-06 DIAGNOSIS — R29.898 BILATERAL LEG WEAKNESS: Primary | ICD-10-CM

## 2023-03-06 DIAGNOSIS — R29.898 WEAKNESS OF BOTH HIPS: ICD-10-CM

## 2023-03-06 PROCEDURE — 97110 THERAPEUTIC EXERCISES: CPT | Performed by: PHYSICAL THERAPIST

## 2023-03-06 PROCEDURE — 97140 MANUAL THERAPY 1/> REGIONS: CPT | Performed by: PHYSICAL THERAPIST

## 2023-03-06 NOTE — PROGRESS NOTES
Physical Therapy Progress  Note/30-Day Note    Patient: Radha Ballard   : 1955  Referring practitioner: Juana Cooley MD  Date of Initial Visit: Type: THERAPY  Noted: 10/13/2022  Today's Date: 3/6/2023    VISIT#: 23          Subjective   Radha Ballard reports: that the day after she was here on Tuesday, she tripped and fell over a wire in her bedroom and was able to get herself up without assistance from someone.  She does not report any pain or injury other than a bruise on her right knee.  She reports no pain today.     Her LEFI score was 46% functional/54% limited    Objective          Strength/Myotome Testing     Left Hip   Planes of Motion   Flexion: 4-  Extension: 4-  Abduction: 4-    Right Hip   Planes of Motion   Flexion: 4+  Extension: 3+  Abduction: 4-    Additional Strength Details  Ankle DF R 4-/5 and L 4+/5      See Exercise, Manual, and Modality Logs for complete treatment.       Assessment & Plan     Assessment    Assessment details: Pt repots 70% overall improvement since the last progress note, noting that she can transfer from the chair to the couch more easily and can get up from the floor I and sitting and standing more easily.  She can stand for about 1.5 minutes to load the dryer without rest now.  She has met goals as noted below and will be discharged with maintenance HEP.    Goals  Plan Goals: STGs (4 weeks):  1.  Pt will be able to verbalize understanding of HEP.  MET  2.  Pt will be able to stand for 2 minutes without UE support to allow safe and effective performance of ADLs. MET with occasional UE support needed  3.  Pt will demonstrate 4/5 B hip extension, abduction and flexion.  PROGRESSING    LTGs (12 weeks):    1.  Pt will be I with finalized HEP.  MET  2.  Pt will be able to stand unsupported for 3 minutes while performing UE movements to simulate putting away dishes and brushing teeth.  MET with UE support intermittently  MET with UE support  3.  Pt will  demonstrate 4+/5 B hip abduction and extension and B ankle DF and eversion.  NOT MET            Other  Discharge with maintenance HEP           Timed:  Manual Therapy:    8     mins  32195;  Therapeutic Exercise:    15     mins  62449;     Neuromuscular Shelby:        mins  30511;    Therapeutic Activity:          mins  28646;     Gait Training:           mins  74104;     Ultrasound:          mins  24756;    Electrical Stimulation:         mins  29231 ( );    Untimed:  Electrical Stimulation:         mins  72261 ( );  Mechanical Traction:         mins  43786;   Dry needling:       Self pay    Timed Treatment:   23   mins   Total Treatment:     23   mins  Mary Michaels PT, DPT, CLT, CIDN  Physical Therapist

## 2023-04-26 ENCOUNTER — TELEPHONE (OUTPATIENT)
Dept: FAMILY MEDICINE CLINIC | Facility: CLINIC | Age: 68
End: 2023-04-26

## 2023-04-26 NOTE — TELEPHONE ENCOUNTER
Left a vm stating we have not received this form and to please fax it to us and I left our fax number on the vm I will try again to call her to verbally inform her of this

## 2023-04-26 NOTE — TELEPHONE ENCOUNTER
Caller: MARY CHEEK    Relationship: Other    Best call back number: [phone]    What is the best time to reach you: ANY TIME    Who are you requesting to speak with (clinical staff, provider,  specific staff member): CLINICAL STAFF    What was the call regarding: [employer] CALLED TO SEE IF WE RECEIVED A FAX FOR WHEELCHAIR ORDER FOR PATIENT FAXED ON 4/20/23. CALLED TO CHECK ON THE STATUS    PLEASE ADVISE    Do you require a callback: YES

## 2023-08-11 RX ORDER — MELOXICAM 15 MG/1
15 TABLET ORAL DAILY
Qty: 90 TABLET | Refills: 1 | Status: SHIPPED | OUTPATIENT
Start: 2023-08-11

## 2023-10-06 RX ORDER — NEBIVOLOL 20 MG/1
TABLET ORAL
Qty: 90 TABLET | Refills: 3 | Status: SHIPPED | OUTPATIENT
Start: 2023-10-06

## 2023-11-06 RX ORDER — ATORVASTATIN CALCIUM 20 MG/1
TABLET, FILM COATED ORAL
Qty: 90 TABLET | Refills: 3 | Status: SHIPPED | OUTPATIENT
Start: 2023-11-06

## 2024-01-09 ENCOUNTER — OFFICE VISIT (OUTPATIENT)
Dept: FAMILY MEDICINE CLINIC | Facility: CLINIC | Age: 69
End: 2024-01-09
Payer: MEDICARE

## 2024-01-09 ENCOUNTER — LAB (OUTPATIENT)
Dept: FAMILY MEDICINE CLINIC | Facility: CLINIC | Age: 69
End: 2024-01-09
Payer: MEDICARE

## 2024-01-09 VITALS
SYSTOLIC BLOOD PRESSURE: 115 MMHG | OXYGEN SATURATION: 99 % | HEART RATE: 61 BPM | TEMPERATURE: 98 F | DIASTOLIC BLOOD PRESSURE: 74 MMHG

## 2024-01-09 DIAGNOSIS — E78.5 HYPERLIPIDEMIA, UNSPECIFIED HYPERLIPIDEMIA TYPE: ICD-10-CM

## 2024-01-09 DIAGNOSIS — Z00.00 MEDICARE ANNUAL WELLNESS VISIT, SUBSEQUENT: Primary | ICD-10-CM

## 2024-01-09 LAB
ALBUMIN SERPL-MCNC: 4.1 G/DL (ref 3.5–5.2)
ALBUMIN/GLOB SERPL: 1.6 G/DL
ALP SERPL-CCNC: 87 U/L (ref 39–117)
ALT SERPL W P-5'-P-CCNC: 34 U/L (ref 1–33)
ANION GAP SERPL CALCULATED.3IONS-SCNC: 11.8 MMOL/L (ref 5–15)
AST SERPL-CCNC: 27 U/L (ref 1–32)
BILIRUB SERPL-MCNC: 0.3 MG/DL (ref 0–1.2)
BUN SERPL-MCNC: 16 MG/DL (ref 8–23)
BUN/CREAT SERPL: 22.9 (ref 7–25)
CALCIUM SPEC-SCNC: 9.2 MG/DL (ref 8.6–10.5)
CHLORIDE SERPL-SCNC: 105 MMOL/L (ref 98–107)
CHOLEST SERPL-MCNC: 237 MG/DL (ref 0–200)
CO2 SERPL-SCNC: 25.2 MMOL/L (ref 22–29)
CREAT SERPL-MCNC: 0.7 MG/DL (ref 0.57–1)
EGFRCR SERPLBLD CKD-EPI 2021: 94.9 ML/MIN/1.73
GLOBULIN UR ELPH-MCNC: 2.5 GM/DL
GLUCOSE SERPL-MCNC: 108 MG/DL (ref 65–99)
HDLC SERPL-MCNC: 68 MG/DL (ref 40–60)
LDLC SERPL CALC-MCNC: 139 MG/DL (ref 0–100)
LDLC/HDLC SERPL: 1.98 {RATIO}
POTASSIUM SERPL-SCNC: 4.8 MMOL/L (ref 3.5–5.2)
PROT SERPL-MCNC: 6.6 G/DL (ref 6–8.5)
SODIUM SERPL-SCNC: 142 MMOL/L (ref 136–145)
TRIGL SERPL-MCNC: 172 MG/DL (ref 0–150)
VLDLC SERPL-MCNC: 30 MG/DL (ref 5–40)

## 2024-01-09 PROCEDURE — 36415 COLL VENOUS BLD VENIPUNCTURE: CPT | Performed by: FAMILY MEDICINE

## 2024-01-09 PROCEDURE — 80053 COMPREHEN METABOLIC PANEL: CPT | Performed by: FAMILY MEDICINE

## 2024-01-09 PROCEDURE — 1160F RVW MEDS BY RX/DR IN RCRD: CPT | Performed by: FAMILY MEDICINE

## 2024-01-09 PROCEDURE — 1159F MED LIST DOCD IN RCRD: CPT | Performed by: FAMILY MEDICINE

## 2024-01-09 PROCEDURE — 3078F DIAST BP <80 MM HG: CPT | Performed by: FAMILY MEDICINE

## 2024-01-09 PROCEDURE — 80061 LIPID PANEL: CPT | Performed by: FAMILY MEDICINE

## 2024-01-09 PROCEDURE — 3074F SYST BP LT 130 MM HG: CPT | Performed by: FAMILY MEDICINE

## 2024-01-09 PROCEDURE — G0439 PPPS, SUBSEQ VISIT: HCPCS | Performed by: FAMILY MEDICINE

## 2024-01-09 RX ORDER — DIROXIMEL FUMARATE 231 MG/1
231 CAPSULE ORAL 2 TIMES DAILY
COMMUNITY
Start: 2023-07-24

## 2024-01-09 RX ORDER — LATANOPROST 50 UG/ML
1 SOLUTION/ DROPS OPHTHALMIC NIGHTLY
COMMUNITY
Start: 2023-11-28

## 2024-01-09 RX ORDER — CRANBERRY FRUIT EXTRACT 200 MG
CAPSULE ORAL
COMMUNITY

## 2024-01-09 NOTE — PROGRESS NOTES
The ABCs of the Annual Wellness Visit  Subsequent Medicare Wellness Visit    Subjective      Radha Ballard is a 68 y.o. female who presents for a Subsequent Medicare Wellness Visit.    The following portions of the patient's history were reviewed and   updated as appropriate: allergies, current medications, past family history, past medical history, past social history, past surgical history, and problem list.    Compared to one year ago, the patient feels her physical   health is the same.    Compared to one year ago, the patient feels her mental   health is the same.    Recent Hospitalizations:  She was not admitted to the hospital during the last year.       Current Medical Providers:  Patient Care Team:  Gretta Canada MD as PCP - General (Family Medicine)  Juana Cooley MD (Neurology)    Outpatient Medications Prior to Visit   Medication Sig Dispense Refill    Cholecalciferol (VITAMIN D3) 2000 units tablet Daily.      Cholecalciferol 10 MCG (400 UNIT) capsule       latanoprost (XALATAN) 0.005 % ophthalmic solution Administer 1 drop to both eyes Every Night.      meloxicam (MOBIC) 15 MG tablet TAKE 1 TABLET BY MOUTH DAILY 90 tablet 1    Multiple Vitamins-Minerals (MULTIVITAMIN WOMEN 50+ PO) Take 1 tablet by mouth Every Night.      multivitamin with minerals tablet tablet       nebivolol (BYSTOLIC) 20 MG tablet TAKE 1 TABLET DAILY 90 tablet 3    nystatin (MYCOSTATIN) 482785 UNIT/GM ointment Apply 1 application topically to the appropriate area as directed 2 (Two) Times a Day. 30 g 1    Red Yeast Rice Extract 600 MG capsule Take  by mouth.      Vumerity 231 MG capsule delayed-release Take 1 capsule by mouth 2 (Two) Times a Day.      atorvastatin (LIPITOR) 20 MG tablet TAKE 1 TABLET BY MOUTH DAILY (Patient not taking: Reported on 1/9/2024) 90 tablet 3     No facility-administered medications prior to visit.       No opioid medication identified on active medication list. I have reviewed chart for  "other potential  high risk medication/s and harmful drug interactions in the elderly.        Aspirin is not on active medication list.  Aspirin use is not indicated based on review of current medical condition/s. Risk of harm outweighs potential benefits.  .    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    Hip pain, right    Hypertension    Lymphocytopenia    Multiple sclerosis    Neuritis    Osteoarthritis of right hip    Atopic dermatitis    Other screening mammogram    Postmenopausal status    Scoliosis of lumbar spine    Encounter for general adult medical examination without abnormal findings    Status post right hip replacement    Acute pain of left knee    Acute cystitis with hematuria    Elevated cholesterol    Acute metabolic encephalopathy    Functional diarrhea    Medicare annual wellness visit, subsequent    Hyperlipidemia    Uterine leiomyoma    Seborrheic keratosis     Advance Care Planning   Advance Care Planning     Advance Directive is not on file.  ACP discussion was held with the patient during this visit. Patient does not have an advance directive, information provided.     Objective    Vitals:    01/09/24 0948   BP: 115/74   BP Location: Right arm   Patient Position: Sitting   Cuff Size: Large Adult   Pulse: 61   Temp: 98 °F (36.7 °C)   TempSrc: Infrared   SpO2: 99%   Weight: Comment: pt in wheelchair     Estimated body mass index is 32.28 kg/m² as calculated from the following:    Height as of 8/2/22: 167.6 cm (66\").    Weight as of 8/2/22: 90.7 kg (200 lb).    BMI is >= 30 and <35. (Class 1 Obesity). The following options were offered after discussion;: exercise counseling/recommendations  Physical Exam  Vitals and nursing note reviewed.   Constitutional:       General: She is not in acute distress.     Appearance: She is well-developed.      Comments: In power wheelchair   HENT:      Head: Normocephalic.   Eyes:      " General: Lids are normal.      Conjunctiva/sclera: Conjunctivae normal.   Neck:      Thyroid: No thyroid mass or thyromegaly.      Trachea: Trachea normal.   Cardiovascular:      Rate and Rhythm: Normal rate and regular rhythm.      Heart sounds: Normal heart sounds.   Pulmonary:      Effort: Pulmonary effort is normal.      Breath sounds: Normal breath sounds.   Musculoskeletal:      Cervical back: Normal range of motion.      Right lower leg: No edema.      Left lower leg: No edema.   Lymphadenopathy:      Cervical: No cervical adenopathy.   Skin:     General: Skin is warm and dry.   Neurological:      Mental Status: She is alert and oriented to person, place, and time. Mental status is at baseline.   Psychiatric:         Attention and Perception: She is attentive.         Mood and Affect: Mood normal.         Speech: Speech normal.         Behavior: Behavior normal.           Does the patient have evidence of cognitive impairment?   No            HEALTH RISK ASSESSMENT    Smoking Status:  Social History     Tobacco Use   Smoking Status Former    Packs/day: 0.25    Years: 5.00    Additional pack years: 0.00    Total pack years: 1.25    Types: Cigarettes   Smokeless Tobacco Never   Tobacco Comments    quit at age 28     Alcohol Consumption:  Social History     Substance and Sexual Activity   Alcohol Use Not Currently    Comment: socially     Fall Risk Screen:    STEADI Fall Risk Assessment was completed, and patient is at LOW risk for falls.Assessment completed on:2024    Depression Screenin/9/2024     9:55 AM   PHQ-2/PHQ-9 Depression Screening   Little Interest or Pleasure in Doing Things 0-->not at all   Feeling Down, Depressed or Hopeless 0-->not at all   PHQ-9: Brief Depression Severity Measure Score 0       Health Habits and Functional and Cognitive Screenin/2/2024     5:22 PM   Functional & Cognitive Status   Do you have difficulty preparing food and eating? No   Do you have difficulty  bathing yourself, getting dressed or grooming yourself? No   Do you have difficulty using the toilet? No   Do you have difficulty moving around from place to place? Yes   Do you have trouble with steps or getting out of a bed or a chair? Yes   Current Diet Well Balanced Diet   Dental Exam Up to date   Eye Exam Up to date   Exercise (times per week) 0 times per week   Do you need help using the phone?  No   Are you deaf or do you have serious difficulty hearing?  No   Do you need help to go to places out of walking distance? No   Do you need help shopping? No   Do you need help preparing meals?  Yes   Do you need help with housework?  Yes   Do you need help with laundry? No   Do you need help taking your medications? No   Do you need help managing money? No   Do you ever drive or ride in a car without wearing a seat belt? No   Have you felt unusual stress, anger or loneliness in the last month? No   Who do you live with? Spouse   If you need help, do you have trouble finding someone available to you? No   Have you been bothered in the last four weeks by sexual problems? No   Do you have difficulty concentrating, remembering or making decisions? No       Age-appropriate Screening Schedule:  Refer to the list below for future screening recommendations based on patient's age, sex and/or medical conditions. Orders for these recommended tests are listed in the plan section. The patient has been provided with a written plan.    Health Maintenance   Topic Date Due    ZOSTER VACCINE (2 of 3) 11/12/2015    DXA SCAN  12/10/2022    LIPID PANEL  08/31/2023    MAMMOGRAM  12/16/2024    ANNUAL WELLNESS VISIT  01/09/2025    PAP SMEAR  08/31/2025    COLORECTAL CANCER SCREENING  11/09/2031    TDAP/TD VACCINES (2 - Td or Tdap) 08/02/2032    HEPATITIS C SCREENING  Completed    COVID-19 Vaccine  Completed    INFLUENZA VACCINE  Completed    Pneumococcal Vaccine 65+  Completed                  CMS Preventative Services Quick  Reference  Risk Factors Identified During Encounter:    Inactivity/Sedentary: Patient was advised to exercise at least 150 minutes a week per CDC recommendations.  Dental Screening Recommended  Vision Screening Recommended    The above risks/problems have been discussed with the patient.  Pertinent information has been shared with the patient in the After Visit Summary.    Diagnoses and all orders for this visit:    1. Medicare annual wellness visit, subsequent (Primary)    2. Hyperlipidemia, unspecified hyperlipidemia type  -     Comprehensive Metabolic Panel  -     Lipid Panel        Follow Up:   Next Medicare Wellness visit to be scheduled in 1 year.      An After Visit Summary and PPPS were made available to the patient.

## 2024-01-29 RX ORDER — MELOXICAM 15 MG/1
15 TABLET ORAL DAILY
Qty: 90 TABLET | Refills: 1 | Status: SHIPPED | OUTPATIENT
Start: 2024-01-29

## 2024-03-23 ENCOUNTER — APPOINTMENT (OUTPATIENT)
Dept: GENERAL RADIOLOGY | Facility: HOSPITAL | Age: 69
End: 2024-03-23
Payer: MEDICARE

## 2024-03-23 ENCOUNTER — HOSPITAL ENCOUNTER (EMERGENCY)
Facility: HOSPITAL | Age: 69
Discharge: HOME | End: 2024-03-23
Attending: EMERGENCY MEDICINE
Payer: MEDICARE

## 2024-03-23 VITALS
RESPIRATION RATE: 16 BRPM | WEIGHT: 205 LBS | OXYGEN SATURATION: 99 % | DIASTOLIC BLOOD PRESSURE: 75 MMHG | HEIGHT: 66 IN | TEMPERATURE: 98.2 F | HEART RATE: 64 BPM | BODY MASS INDEX: 32.95 KG/M2 | SYSTOLIC BLOOD PRESSURE: 148 MMHG

## 2024-03-23 DIAGNOSIS — M79.672 LEFT FOOT PAIN: Primary | ICD-10-CM

## 2024-03-23 DIAGNOSIS — S90.222A SUBUNGUAL HEMATOMA OF FOOT, LEFT, INITIAL ENCOUNTER: ICD-10-CM

## 2024-03-23 DIAGNOSIS — S92.425A CLOSED NONDISPLACED FRACTURE OF DISTAL PHALANX OF LEFT GREAT TOE, INITIAL ENCOUNTER: ICD-10-CM

## 2024-03-23 PROCEDURE — 73630 X-RAY EXAM OF FOOT: CPT

## 2024-03-23 PROCEDURE — 99283 EMERGENCY DEPT VISIT LOW MDM: CPT

## 2024-03-23 NOTE — ED PROVIDER NOTES
"Subjective   History of Present Illness  Patient is a 68-year-old female PMH significant for multiple sclerosis presenting to the ED with complaints of left foot pain after she hit it on the wall around 2 AM this morning while in her wheelchair.  She reports some more bruising along her great toe.  She states pain is worse with movement better with rest she currently rates it as minimal.  She denies any numbness or weakness of the legs. She is on no blood thinners has no other complaints.    History provided by:  Patient      Review of Systems   Respiratory:  Negative for shortness of breath.    Cardiovascular:  Negative for chest pain.   Gastrointestinal:  Negative for nausea and vomiting.   Musculoskeletal:  Positive for arthralgias.   Skin:  Positive for wound.   Neurological:  Negative for numbness.       Past Medical History:   Diagnosis Date    Colon polyp     Elevated cholesterol     Encounter for Zostavax administration     H/O bladder infections     History of hepatitis A vaccination 2018    History of lymphopenia     due ro Lemtrada    Hyperlipidemia     Hypertension     Meningitis 1969    Multiple sclerosis     Neuromuscular disorder     Optic neuritis     Sleep apnea 09/01/2016    Bi-pap machine    Spinal stenosis        Allergies   Allergen Reactions    Amlodipine Besylate Swelling    Losartan Rash    Lisinopril Cough       Past Surgical History:   Procedure Laterality Date    BREAST BIOPSY Right 1998    benign    CATARACT EXTRACTION, BILATERAL  03/2019    COLONOSCOPY      Unsure of date \"Don't Remember.\"    COLONOSCOPY N/A 11/9/2021    Procedure: COLONOSCOPY to cecum with polypectomy and biopsies (cold bx, cliip);  Surgeon: Pardeep Aponte MD;  Location: [facility] ENDOSCOPY;  Service: General;  Laterality: N/A;  pre - change in bowel habits  post - polyps    HIP BIPOLAR REPLACEMENT Left 01/2014    Dr Stern    LAMINOTOMY      L-3-L-4-L-5 Dr Menezes    OTHER SURGICAL HISTORY      multiple SCLEROSIS " plaque    OTHER SURGICAL HISTORY      CR[facility]AL MEMINGROMA-[facility], [place]    SPINE SURGERY      minimally invasive spine surgery- Dr Torres       Family History   Problem Relation Age of Onset    Cancer Mother         breast    Alzheimer's disease Mother     Arthritis Mother     Other Father     Arthritis Father     Hypertension Father     Hypertension Other     COPD Other        Social History     Socioeconomic History    Marital status:    Tobacco Use    Smoking status: Former     Current packs/day: 0.25     Average packs/day: 0.3 packs/day for 5.0 years (1.3 ttl pk-yrs)     Types: Cigarettes    Smokeless tobacco: Never    Tobacco comments:     quit at age 28   Vaping Use    Vaping status: Never Used   Substance and Sexual Activity    Alcohol use: Not Currently     Comment: socially    Drug use: Yes     Types: Marijuana    Sexual activity: Yes     Partners: Female     Birth control/protection: None     Comment: For 6-7 years, 20 years ago           Objective   Physical Exam  Vitals and nursing note reviewed.   Constitutional:       General: She is not in acute distress.     Appearance: Normal appearance. She is well-developed. She is not ill-appearing, toxic-appearing or diaphoretic.   HENT:      Head: Normocephalic and atraumatic.      Mouth/Throat:      Mouth: Mucous membranes are moist.      Pharynx: Oropharynx is clear.   Eyes:      General: No scleral icterus.     Extraocular Movements: Extraocular movements intact.      Pupils: Pupils are equal, round, and reactive to light.   Cardiovascular:      Rate and Rhythm: Normal rate and regular rhythm.      Pulses: Normal pulses.      Heart sounds: No murmur heard.     No friction rub. No gallop.   Pulmonary:      Effort: Pulmonary effort is normal. No respiratory distress.      Breath sounds: Normal breath sounds. No stridor. No wheezing, rhonchi or rales.   Chest:      Chest wall: No tenderness.   Musculoskeletal:      Left ankle: Normal.      Left foot:  "Decreased range of motion. Normal capillary refill. Swelling and bony tenderness present. No deformity.      Comments: Peripheral pulses intact compartments are soft.  Patient has some ecchymosis and edema noted mainly along the great toe to the top of the foot.  There is some dried blood at the distal aspect of the toenail no laceration noted however she does have a subungual hematoma.  No active bleeding or drainage.  Nailbed appears intact. sensation intact.   Skin:     General: Skin is warm.      Capillary Refill: Capillary refill takes less than 2 seconds.      Coloration: Skin is not cyanotic, jaundiced or pale.      Findings: No rash.   Neurological:      General: No focal deficit present.      Mental Status: She is alert and oriented to person, place, and time.   Psychiatric:         Mood and Affect: Mood normal.         Behavior: Behavior normal.         Procedures           ED Course    /75 (BP Location: Right arm, Patient Position: Sitting)   Pulse 64   Temp 98.2 °F (36.8 °C)   Resp 16   Ht 167.6 cm (66\")   Wt 93 kg (205 lb)   SpO2 99%   BMI 33.09 kg/m²   Medications - No data to display  Labs Reviewed - No data to display  XR Foot 3+ View Left   Final Result   Impression:   Fracture involving the base of the first digit distal phalanx.         Electronically Signed: Mark Epps MD     3/23/2024 11:03 AM EDT     Workstation ID: [id number]                                                 Medical Decision Making  Differentials: Fracture, dislocation, contusion, sprain, strain      ;this list is not all inclusive and does not constitute the entirety of considered causes  Radiology: My interpretation left foot x-ray shows distal phalanx fracture correlated with radiologist interpretation as below  XR Foot 3+ View Left   Final Result    Impression:    Fracture involving the base of the first digit distal phalanx.         Electronically Signed: Mark Epps MD      3/23/2024 11:03 AM EDT      " Workstation ID: [id number]     Disposition/Treatment:  Appropriate PPE was worn during exam and throughout all encounters with the patient.  On the day patient was afebrile and appeared nontoxic present with complaints of left foot pain after an injury around 2 AM this morning she declined pain medicine while in the ED x-ray was obtained which showed distal phalanx fracture as described above.  Upon reassessment patient is resting quietly findings were discussed with the patient at bedside she was offered trephination for subungual hematoma she declined at this time states it is not very painful.  Patient voiced understanding of discharge along with signs and symptoms to return patient declined narcotic pain medicine for home states she will take ibuprofen as needed.  She was advised to follow-up with PCP for further evaluation management or podiatry as desired.  Questions were answered at bedside.    This document is intended for medical expert use only. Reading of this document by patients and/or patient's family without participating medical staff guidance may result in misinterpretation and unintended morbidity.  Any interpretation of such data is the responsibility of the patient and/or family member responsible for the patient in concert with their primary or specialist providers, not to be left for sources of online searches such as [employer], [employer] or similar queries. Relying on these approaches to knowledge may result in misinterpretation, misguided goals of care and even death should patients or family members try recommendations outside of the realm of professional medical care in a supervised inpatient environment.       Amount and/or Complexity of Data Reviewed  Radiology: ordered. Decision-making details documented in ED Course.        Final diagnoses:   Left foot pain   Closed nondisplaced fracture of distal phalanx of left great toe, initial encounter   Subungual hematoma of foot, left, initial encounter        ED Disposition  ED Disposition       ED Disposition   Discharge    Condition   Stable    Comment   --               Gretta Canada MD  [address]  [place] [address] [phone]    Schedule an appointment as soon as possible for a visit in 3 days      [facility] EMERGENCY DEPARTMENT  [address] [address] [phone]  Go to   If symptoms worsen    MAINOR Ambrosio DPM  [address]  [place] [address] [phone]    Call   As needed         Medication List      No changes were made to your prescriptions during this visit.            Valeriano Rodney PA  03/23/24 1139

## 2024-03-23 NOTE — DISCHARGE INSTRUCTIONS
Wear postop shoe as needed for the next 1 to 2 weeks for pain.  Apply cool compresses and elevate your left foot for 20 minutes at a time for the next 72 hours help with swelling and pain.    Tylenol or ibuprofen as needed for pain.  Follow-up with podiatrist as desired    Follow-up with your primary care provider in 3-5 days.  If you do not have a primary care provider call [phone] for help in finding one, or you may follow up with [facility] at [phone].    Return to ED for any new or worsening symptoms

## 2024-05-01 ENCOUNTER — HOSPITAL ENCOUNTER (EMERGENCY)
Facility: HOSPITAL | Age: 69
Discharge: HOME | End: 2024-05-01
Attending: EMERGENCY MEDICINE
Payer: MEDICARE

## 2024-05-01 VITALS
BODY MASS INDEX: 32.14 KG/M2 | HEIGHT: 66 IN | RESPIRATION RATE: 16 BRPM | SYSTOLIC BLOOD PRESSURE: 134 MMHG | OXYGEN SATURATION: 98 % | HEART RATE: 71 BPM | WEIGHT: 200 LBS | TEMPERATURE: 98.1 F | DIASTOLIC BLOOD PRESSURE: 71 MMHG

## 2024-05-01 DIAGNOSIS — H10.213 CHEMICAL CONJUNCTIVITIS OF BOTH EYES: Primary | ICD-10-CM

## 2024-05-01 PROCEDURE — 99283 EMERGENCY DEPT VISIT LOW MDM: CPT

## 2024-05-01 RX ORDER — PROPARACAINE HYDROCHLORIDE 5 MG/ML
SOLUTION/ DROPS OPHTHALMIC
Status: COMPLETED
Start: 2024-05-01 | End: 2024-05-01

## 2024-05-01 RX ORDER — KETOROLAC TROMETHAMINE 5 MG/ML
1 SOLUTION OPHTHALMIC 4 TIMES DAILY
Status: DISCONTINUED | OUTPATIENT
Start: 2024-05-01 | End: 2024-05-01 | Stop reason: HOSPADM

## 2024-05-01 RX ORDER — PROPARACAINE HYDROCHLORIDE 5 MG/ML
1 SOLUTION/ DROPS OPHTHALMIC ONCE
Status: COMPLETED | OUTPATIENT
Start: 2024-05-01 | End: 2024-05-01

## 2024-05-01 RX ADMIN — KETOROLAC TROMETHAMINE 1 DROP: 5 SOLUTION/ DROPS OPHTHALMIC at 19:11

## 2024-05-01 RX ADMIN — FLUORESCEIN SODIUM 1 STRIP: 1 STRIP OPHTHALMIC at 19:11

## 2024-05-01 RX ADMIN — PROPARACAINE HYDROCHLORIDE 1 DROP: 5 SOLUTION/ DROPS OPHTHALMIC at 19:11

## 2024-05-01 NOTE — DISCHARGE INSTRUCTIONS
Elevate head tonight  Do not rub your eyes  Can use Acular/ketorolac drops every 6 hours 1 drop in each eye as needed for discomfort and irritation  Follow-up with ophthalmologist if symptoms continue for more than 2 days  Make sure to use your latanoprost tonight, nightly

## 2024-05-01 NOTE — ED PROVIDER NOTES
"Subjective   History of Present Illness  69-year-old female history of multiple sclerosis presents with a sudden onset of eye irritation after placing  eye lubrication drops into her eyes.  The patient reports immediate burning as soon as they were instilled.  The patient reports that she has had no recent photophobia or visual changes.  She states she has had some eye problems in the past related to her MS but there is been no recent exacerbation.  She was asymptomatic prior to the instillation lubrication drops and reports that she has had no history of recent allergic conjunctivitis or known infectious conjunctivitis exposure      Review of Systems    Past Medical History:   Diagnosis Date    Colon polyp     Elevated cholesterol     Encounter for Zostavax administration     H/O bladder infections     History of hepatitis A vaccination     History of lymphopenia     due ro Lemtrada    Hyperlipidemia     Hypertension     Meningitis 1969    Multiple sclerosis     Neuromuscular disorder     Optic neuritis     Sleep apnea 2016    Bi-pap machine    Spinal stenosis        Allergies   Allergen Reactions    Amlodipine Besylate Swelling    Losartan Rash    Lisinopril Cough       Past Surgical History:   Procedure Laterality Date    BREAST BIOPSY Right     benign    CATARACT EXTRACTION, BILATERAL  2019    COLONOSCOPY      Unsure of date \"Don't Remember.\"    COLONOSCOPY N/A 2021    Procedure: COLONOSCOPY to cecum with polypectomy and biopsies (cold bx, cliip);  Surgeon: Pardeep Aponte MD;  Location: [facility] ENDOSCOPY;  Service: General;  Laterality: N/A;  pre - change in bowel habits  post - polyps    HIP BIPOLAR REPLACEMENT Left 2014    Dr Stern    LAMINOTOMY      L-3-L-4-L-5 Dr Menezes    OTHER SURGICAL HISTORY      multiple SCLEROSIS plaque    OTHER SURGICAL HISTORY      CRAINAL MEMINGROMA- [facility]    SPINE SURGERY      minimally invasive spine surgery- Dr Torres "       Family History   Problem Relation Age of Onset    Cancer Mother         breast    Alzheimer's disease Mother     Arthritis Mother     Other Father     Arthritis Father     Hypertension Father     Hypertension Other     COPD Other        Social History     Socioeconomic History    Marital status:    Tobacco Use    Smoking status: Former     Current packs/day: 0.25     Average packs/day: 0.3 packs/day for 5.0 years (1.3 ttl pk-yrs)     Types: Cigarettes    Smokeless tobacco: Never    Tobacco comments:     quit at age 28   Vaping Use    Vaping status: Never Used   Substance and Sexual Activity    Alcohol use: Not Currently     Comment: socially    Drug use: Yes     Types: Marijuana    Sexual activity: Yes     Partners: Female     Birth control/protection: None     Comment: For 6-7 years, 20 years ago           Objective   Physical Exam  HEENT: The patient has pupils that are equal and reactive to light and accommodation.  Extraocular movements are intact without diplopia.  Lid eversion x 4 reveals no evidence of foreign object.  There is no evidence of hyphema or hypopyon.  The eye does not appear proptotic   Procedures  Patient was anesthetized with tetracaine fluorescein was instilled some superficial corneal irritation was identified however there is no evidence of flare and anterior chamber was clear.  No corneal abrasion was detected.  The eye was then irrigated with saline and Acular instilled         ED Course                       Labs Reviewed - No data to display  Medications   proparacaine (ALCAINE) 0.5 % ophthalmic solution 1 drop (has no administration in time range)   fluorescein ophthalmic strip 1 strip (has no administration in time range)   ketorolac (ACULAR) 0.5 % ophthalmic solution 1 drop (has no administration in time range)     No radiology results for the last day                        Medical Decision Making  Patient was advised to discard all of the  eyedrops.  She was  encouraged to use the Acular every 6 hours for the next 24 hours and follow-up with ophthalmology for continued problems.  The patient was encouraged to continue her glaucoma medication tonight and states that she has recently had pressures taken that were normal.  The patient was stable at discharge and vocalized understanding of discharge instructions and warning    Risk  Prescription drug management.        Final diagnoses:   Chemical conjunctivitis of both eyes       ED Disposition  ED Disposition       ED Disposition   Discharge    Condition   Stable    Comment   --               Gretta Canada MD  [address] [address]  [phone]          Ophthalmology  [phone]             Medication List      No changes were made to your prescriptions during this visit.            Tez Monk MD  05/01/24 3113

## 2024-07-24 RX ORDER — MELOXICAM 15 MG/1
15 TABLET ORAL DAILY
Qty: 90 TABLET | Refills: 1 | Status: SHIPPED | OUTPATIENT
Start: 2024-07-24

## 2024-07-30 ENCOUNTER — TELEPHONE (OUTPATIENT)
Dept: FAMILY MEDICINE CLINIC | Facility: CLINIC | Age: 69
End: 2024-07-30

## 2024-07-30 DIAGNOSIS — E78.5 HYPERLIPIDEMIA, UNSPECIFIED HYPERLIPIDEMIA TYPE: Primary | ICD-10-CM

## 2024-07-30 DIAGNOSIS — D72.810 LYMPHOCYTOPENIA: ICD-10-CM

## 2024-07-30 NOTE — TELEPHONE ENCOUNTER
Caller: Radha Ballard    Relationship: Self    Best call back number:     [phone]       What orders are you requesting (i.e. lab or imaging): BLOOD WORK    In what timeframe would the patient need to come in: AS SOON AS POSSIBLE    Where will you receive your lab/imaging services: IN OFFICE    Additional notes: 6 MONTH FOLLOW UP

## 2024-08-01 ENCOUNTER — LAB (OUTPATIENT)
Dept: FAMILY MEDICINE CLINIC | Facility: CLINIC | Age: 69
End: 2024-08-01
Payer: MEDICARE

## 2024-08-01 PROCEDURE — 80053 COMPREHEN METABOLIC PANEL: CPT | Performed by: FAMILY MEDICINE

## 2024-08-01 PROCEDURE — 85025 COMPLETE CBC W/AUTO DIFF WBC: CPT | Performed by: FAMILY MEDICINE

## 2024-08-01 PROCEDURE — 80061 LIPID PANEL: CPT | Performed by: FAMILY MEDICINE

## 2024-08-01 PROCEDURE — 36415 COLL VENOUS BLD VENIPUNCTURE: CPT | Performed by: FAMILY MEDICINE

## 2024-08-02 LAB
ALBUMIN SERPL-MCNC: 4.1 G/DL (ref 3.5–5.2)
ALBUMIN/GLOB SERPL: 1.9 G/DL
ALP SERPL-CCNC: 78 U/L (ref 39–117)
ALT SERPL W P-5'-P-CCNC: 15 U/L (ref 1–33)
ANION GAP SERPL CALCULATED.3IONS-SCNC: 9.6 MMOL/L (ref 5–15)
AST SERPL-CCNC: 14 U/L (ref 1–32)
BASOPHILS # BLD AUTO: 0.04 10*3/MM3 (ref 0–0.2)
BASOPHILS NFR BLD AUTO: 0.9 % (ref 0–1.5)
BILIRUB SERPL-MCNC: 0.3 MG/DL (ref 0–1.2)
BUN SERPL-MCNC: 15 MG/DL (ref 8–23)
BUN/CREAT SERPL: 21.4 (ref 7–25)
CALCIUM SPEC-SCNC: 9 MG/DL (ref 8.6–10.5)
CHLORIDE SERPL-SCNC: 107 MMOL/L (ref 98–107)
CHOLEST SERPL-MCNC: 208 MG/DL (ref 0–200)
CO2 SERPL-SCNC: 24.4 MMOL/L (ref 22–29)
CREAT SERPL-MCNC: 0.7 MG/DL (ref 0.57–1)
DEPRECATED RDW RBC AUTO: 42.4 FL (ref 37–54)
EGFRCR SERPLBLD CKD-EPI 2021: 94.3 ML/MIN/1.73
EOSINOPHIL # BLD AUTO: 0.18 10*3/MM3 (ref 0–0.4)
EOSINOPHIL NFR BLD AUTO: 4.2 % (ref 0.3–6.2)
ERYTHROCYTE [DISTWIDTH] IN BLOOD BY AUTOMATED COUNT: 13 % (ref 12.3–15.4)
GLOBULIN UR ELPH-MCNC: 2.2 GM/DL
GLUCOSE SERPL-MCNC: 96 MG/DL (ref 65–99)
HCT VFR BLD AUTO: 40.8 % (ref 34–46.6)
HDLC SERPL-MCNC: 68 MG/DL (ref 40–60)
HGB BLD-MCNC: 13.8 G/DL (ref 12–15.9)
IMM GRANULOCYTES # BLD AUTO: 0.02 10*3/MM3 (ref 0–0.05)
IMM GRANULOCYTES NFR BLD AUTO: 0.5 % (ref 0–0.5)
LDLC SERPL CALC-MCNC: 122 MG/DL (ref 0–100)
LDLC/HDLC SERPL: 1.76 {RATIO}
LYMPHOCYTES # BLD AUTO: 0.71 10*3/MM3 (ref 0.7–3.1)
LYMPHOCYTES NFR BLD AUTO: 16.6 % (ref 19.6–45.3)
MCH RBC QN AUTO: 30.2 PG (ref 26.6–33)
MCHC RBC AUTO-ENTMCNC: 33.8 G/DL (ref 31.5–35.7)
MCV RBC AUTO: 89.3 FL (ref 79–97)
MONOCYTES # BLD AUTO: 0.33 10*3/MM3 (ref 0.1–0.9)
MONOCYTES NFR BLD AUTO: 7.7 % (ref 5–12)
NEUTROPHILS NFR BLD AUTO: 3.01 10*3/MM3 (ref 1.7–7)
NEUTROPHILS NFR BLD AUTO: 70.1 % (ref 42.7–76)
NRBC BLD AUTO-RTO: 0 /100 WBC (ref 0–0.2)
PLATELET # BLD AUTO: 171 10*3/MM3 (ref 140–450)
PMV BLD AUTO: 10.7 FL (ref 6–12)
POTASSIUM SERPL-SCNC: 4.5 MMOL/L (ref 3.5–5.2)
PROT SERPL-MCNC: 6.3 G/DL (ref 6–8.5)
RBC # BLD AUTO: 4.57 10*6/MM3 (ref 3.77–5.28)
SODIUM SERPL-SCNC: 141 MMOL/L (ref 136–145)
TRIGL SERPL-MCNC: 101 MG/DL (ref 0–150)
VLDLC SERPL-MCNC: 18 MG/DL (ref 5–40)
WBC NRBC COR # BLD AUTO: 4.29 10*3/MM3 (ref 3.4–10.8)

## 2024-08-13 ENCOUNTER — OFFICE VISIT (OUTPATIENT)
Dept: FAMILY MEDICINE CLINIC | Facility: CLINIC | Age: 69
End: 2024-08-13
Payer: MEDICARE

## 2024-08-13 VITALS
WEIGHT: 206.4 LBS | HEIGHT: 66 IN | HEART RATE: 58 BPM | BODY MASS INDEX: 33.17 KG/M2 | OXYGEN SATURATION: 97 % | DIASTOLIC BLOOD PRESSURE: 77 MMHG | SYSTOLIC BLOOD PRESSURE: 148 MMHG | TEMPERATURE: 97.1 F

## 2024-08-13 DIAGNOSIS — E66.01 MORBID (SEVERE) OBESITY DUE TO EXCESS CALORIES: ICD-10-CM

## 2024-08-13 DIAGNOSIS — E78.5 HYPERLIPIDEMIA, UNSPECIFIED HYPERLIPIDEMIA TYPE: Primary | ICD-10-CM

## 2024-08-13 DIAGNOSIS — I10 PRIMARY HYPERTENSION: ICD-10-CM

## 2024-08-13 PROCEDURE — 3077F SYST BP >= 140 MM HG: CPT | Performed by: FAMILY MEDICINE

## 2024-08-13 PROCEDURE — 1159F MED LIST DOCD IN RCRD: CPT | Performed by: FAMILY MEDICINE

## 2024-08-13 PROCEDURE — G2211 COMPLEX E/M VISIT ADD ON: HCPCS | Performed by: FAMILY MEDICINE

## 2024-08-13 PROCEDURE — 99214 OFFICE O/P EST MOD 30 MIN: CPT | Performed by: FAMILY MEDICINE

## 2024-08-13 PROCEDURE — 3078F DIAST BP <80 MM HG: CPT | Performed by: FAMILY MEDICINE

## 2024-08-13 PROCEDURE — 1160F RVW MEDS BY RX/DR IN RCRD: CPT | Performed by: FAMILY MEDICINE

## 2024-08-13 RX ORDER — TIMOLOL MALEATE 6.8 MG/ML
SOLUTION/ DROPS OPHTHALMIC
COMMUNITY

## 2024-08-13 RX ORDER — ATORVASTATIN CALCIUM 20 MG/1
1 TABLET, FILM COATED ORAL DAILY
COMMUNITY
Start: 2024-07-31 | End: 2024-08-13

## 2024-08-13 NOTE — PROGRESS NOTES
"Chief Complaint  Hyperlipidemia (F/u ) and Hypertension    Subjective        Radha Ballard presents to [facility]  History of Present Illness  She fell at home recently and her wife had trouble picking her up.  She would like some assistance with trying to lose weight for her own health but also to make it easier on caregiver.   Hyperlipidemia  This is a chronic problem. The current episode started more than 1 year ago. The problem is uncontrolled. Exacerbating diseases include obesity. She has no history of chronic renal disease or diabetes. Pertinent negatives include no chest pain or shortness of breath. Treatments tried: red yeast rice. The current treatment provides moderate improvement of lipids. Compliance problems include adherence to exercise.    Hypertension  This is a chronic problem. The current episode started more than 1 year ago. The problem is unchanged. Pertinent negatives include no anxiety, chest pain, peripheral edema or shortness of breath. There are no associated agents to hypertension. The current treatment provides moderate improvement. Compliance problems include exercise.  There is no history of chronic renal disease.       Objective   Vital Signs:  /77 (BP Location: Left arm, Patient Position: Sitting, Cuff Size: Large Adult)   Pulse 58   Temp 97.1 °F (36.2 °C) (Infrared)   Ht 167.6 cm (66\")   Wt 93.6 kg (206 lb 6.4 oz)   SpO2 97%   BMI 33.31 kg/m²   Estimated body mass index is 33.31 kg/m² as calculated from the following:    Height as of this encounter: 167.6 cm (66\").    Weight as of this encounter: 93.6 kg (206 lb 6.4 oz).            Physical Exam  Vitals and nursing note reviewed.   Constitutional:       General: She is not in acute distress.     Appearance: She is well-developed.      Comments: In power wheelchair   HENT:      Head: Normocephalic.   Eyes:      General: Lids are normal.      Conjunctiva/sclera: Conjunctivae normal. "   Neck:      Thyroid: No thyroid mass or thyromegaly.      Trachea: Trachea normal.   Cardiovascular:      Rate and Rhythm: Normal rate and regular rhythm.      Heart sounds: Normal heart sounds.   Pulmonary:      Effort: Pulmonary effort is normal.      Breath sounds: Normal breath sounds.   Musculoskeletal:      Cervical back: Normal range of motion.      Right lower leg: Edema present.      Left lower leg: No edema.   Lymphadenopathy:      Cervical: No cervical adenopathy.   Skin:     General: Skin is warm and dry.   Neurological:      Mental Status: She is alert and oriented to person, place, and time.   Psychiatric:         Attention and Perception: She is attentive.         Mood and Affect: Mood normal.         Speech: Speech normal.         Behavior: Behavior normal.        Result Review :  The following data was reviewed by: Gretta Canada MD on 08/13/2024:  Common labs          2/29/2024    09:04 6/3/2024    08:55 8/1/2024    13:03   Common Labs   Glucose   96    BUN   15    Creatinine   0.70    Sodium   141    Potassium   4.5    Chloride   107    Calcium   9.0    Albumin   4.1    Total Bilirubin   0.3    Alkaline Phosphatase   78    AST (SGOT)   14    ALT (SGPT)   15    WBC 4.21     4.02     4.29    Hemoglobin 14.8     14.1     13.8    Hematocrit 45.1     42.4     40.8    Platelets 158     151     171    Total Cholesterol   208    Triglycerides   101    HDL Cholesterol   68    LDL Cholesterol    122       Details          This result is from an external source.                       Assessment and Plan   Diagnoses and all orders for this visit:    1. Hyperlipidemia, unspecified hyperlipidemia type (Primary)  Assessment & Plan:   Lipid abnormalities are improving with treatment    Plan:  Continue same medication/s without change.      Counseled patient on lifestyle modifications to help control hyperlipidemia.     Patient Treatment Goals:   LDL goal is under 100    Followup in 6 months.      2. Morbid  (severe) obesity due to excess calories  -     Semaglutide-Weight Management 0.25 MG/0.5ML solution auto-injector; Inject 0.5 mL under the skin into the appropriate area as directed 1 (One) Time Per Week.  Dispense: 3 mL; Refill: 1    3. Primary hypertension  Assessment & Plan:  Hypertension is borderline  Continue current treatment regimen.  Weight loss.  Blood pressure will be reassessedin 6 months.               Follow Up   No follow-ups on file.  Patient was given instructions and counseling regarding her condition or for health maintenance advice. Please see specific information pulled into the AVS if appropriate.             Answers submitted by the patient for this visit:  Other (Submitted on 8/11/2024)  Please describe your symptoms.: High Cholesterol and weight gain  Have you had these symptoms before?: Yes  How long have you been having these symptoms?: Greater than 2 weeks  Please list any medications you are currently taking for this condition.: Astorvastatin  Please describe any probable cause for these symptoms. : Red yeast rice  Primary Reason for Visit (Submitted on 8/11/2024)  What is the primary reason for your visit?: Other

## 2024-08-13 NOTE — ASSESSMENT & PLAN NOTE
Lipid abnormalities are improving with treatment    Plan:  Continue same medication/s without change.      Counseled patient on lifestyle modifications to help control hyperlipidemia.     Patient Treatment Goals:   LDL goal is under 100    Followup in 6 months.

## 2024-08-14 ENCOUNTER — TELEPHONE (OUTPATIENT)
Dept: FAMILY MEDICINE CLINIC | Facility: CLINIC | Age: 69
End: 2024-08-14
Payer: MEDICARE

## 2024-08-14 NOTE — TELEPHONE ENCOUNTER
Caller: Radha Ballard    Relationship: Self    Best call back number:     Radha Ballard (Self) [phone] (Mobile)         What was the call regarding: PATIENT'S INSURANCE IS NOT WANTING TO PAY FOR THE SEMAGLUTIDE AND PATIENT WANTED TO KNOW IF YOU COULD SEND A PRIOR AUTHORIZATION    Is it okay if the provider responds through MyChart: CALL PLEASE

## 2024-12-18 ENCOUNTER — PATIENT MESSAGE (OUTPATIENT)
Dept: FAMILY MEDICINE CLINIC | Facility: CLINIC | Age: 69
End: 2024-12-18
Payer: MEDICARE

## 2024-12-18 DIAGNOSIS — G35 MULTIPLE SCLEROSIS: Primary | ICD-10-CM

## 2024-12-18 DIAGNOSIS — R26.89 BALANCE DISORDER: ICD-10-CM

## 2025-01-14 ENCOUNTER — OFFICE VISIT (OUTPATIENT)
Dept: FAMILY MEDICINE CLINIC | Facility: CLINIC | Age: 70
End: 2025-01-14
Payer: MEDICARE

## 2025-01-14 ENCOUNTER — LAB (OUTPATIENT)
Dept: FAMILY MEDICINE CLINIC | Facility: CLINIC | Age: 70
End: 2025-01-14
Payer: MEDICARE

## 2025-01-14 VITALS
WEIGHT: 209 LBS | BODY MASS INDEX: 33.59 KG/M2 | OXYGEN SATURATION: 97 % | HEART RATE: 53 BPM | HEIGHT: 66 IN | DIASTOLIC BLOOD PRESSURE: 84 MMHG | RESPIRATION RATE: 18 BRPM | TEMPERATURE: 97.5 F | SYSTOLIC BLOOD PRESSURE: 145 MMHG

## 2025-01-14 DIAGNOSIS — Z78.0 POSTMENOPAUSAL STATUS: ICD-10-CM

## 2025-01-14 DIAGNOSIS — Z00.00 MEDICARE ANNUAL WELLNESS VISIT, SUBSEQUENT: Primary | ICD-10-CM

## 2025-01-14 DIAGNOSIS — Z12.31 OTHER SCREENING MAMMOGRAM: ICD-10-CM

## 2025-01-14 DIAGNOSIS — E78.5 HYPERLIPIDEMIA, UNSPECIFIED HYPERLIPIDEMIA TYPE: ICD-10-CM

## 2025-01-14 LAB
ALBUMIN SERPL-MCNC: 3.8 G/DL (ref 3.5–5.2)
ALBUMIN/GLOB SERPL: 1.7 G/DL
ALP SERPL-CCNC: 84 U/L (ref 39–117)
ALT SERPL W P-5'-P-CCNC: 26 U/L (ref 1–33)
ANION GAP SERPL CALCULATED.3IONS-SCNC: 6.6 MMOL/L (ref 5–15)
AST SERPL-CCNC: 23 U/L (ref 1–32)
BILIRUB SERPL-MCNC: 0.4 MG/DL (ref 0–1.2)
BUN SERPL-MCNC: 20 MG/DL (ref 8–23)
BUN/CREAT SERPL: 30.8 (ref 7–25)
CALCIUM SPEC-SCNC: 9.1 MG/DL (ref 8.6–10.5)
CHLORIDE SERPL-SCNC: 104 MMOL/L (ref 98–107)
CHOLEST SERPL-MCNC: 217 MG/DL (ref 0–200)
CO2 SERPL-SCNC: 27.4 MMOL/L (ref 22–29)
CREAT SERPL-MCNC: 0.65 MG/DL (ref 0.57–1)
EGFRCR SERPLBLD CKD-EPI 2021: 96 ML/MIN/1.73
GLOBULIN UR ELPH-MCNC: 2.3 GM/DL
GLUCOSE SERPL-MCNC: 98 MG/DL (ref 65–99)
HDLC SERPL-MCNC: 61 MG/DL (ref 40–60)
LDLC SERPL CALC-MCNC: 135 MG/DL (ref 0–100)
LDLC/HDLC SERPL: 2.16 {RATIO}
POTASSIUM SERPL-SCNC: 4.5 MMOL/L (ref 3.5–5.2)
PROT SERPL-MCNC: 6.1 G/DL (ref 6–8.5)
SODIUM SERPL-SCNC: 138 MMOL/L (ref 136–145)
TRIGL SERPL-MCNC: 120 MG/DL (ref 0–150)
VLDLC SERPL-MCNC: 21 MG/DL (ref 5–40)

## 2025-01-14 PROCEDURE — 80053 COMPREHEN METABOLIC PANEL: CPT | Performed by: FAMILY MEDICINE

## 2025-01-14 PROCEDURE — G0439 PPPS, SUBSEQ VISIT: HCPCS | Performed by: FAMILY MEDICINE

## 2025-01-14 PROCEDURE — 3077F SYST BP >= 140 MM HG: CPT | Performed by: FAMILY MEDICINE

## 2025-01-14 PROCEDURE — 3079F DIAST BP 80-89 MM HG: CPT | Performed by: FAMILY MEDICINE

## 2025-01-14 PROCEDURE — 80061 LIPID PANEL: CPT | Performed by: FAMILY MEDICINE

## 2025-01-14 PROCEDURE — 1160F RVW MEDS BY RX/DR IN RCRD: CPT | Performed by: FAMILY MEDICINE

## 2025-01-14 PROCEDURE — 1126F AMNT PAIN NOTED NONE PRSNT: CPT | Performed by: FAMILY MEDICINE

## 2025-01-14 PROCEDURE — 1159F MED LIST DOCD IN RCRD: CPT | Performed by: FAMILY MEDICINE

## 2025-01-14 PROCEDURE — 36415 COLL VENOUS BLD VENIPUNCTURE: CPT | Performed by: FAMILY MEDICINE

## 2025-01-14 PROCEDURE — 1170F FXNL STATUS ASSESSED: CPT | Performed by: FAMILY MEDICINE

## 2025-01-14 RX ORDER — MELOXICAM 15 MG/1
15 TABLET ORAL DAILY
Qty: 90 TABLET | Refills: 1 | Status: SHIPPED | OUTPATIENT
Start: 2025-01-14

## 2025-01-14 NOTE — ASSESSMENT & PLAN NOTE
Lipid abnormalities are stable    Plan:  Continue same medication/s without change.      Counseled patient on lifestyle modifications to help control hyperlipidemia.     Patient Treatment Goals:   LDL goal is under 100    Followup at the next regular appointment.    Orders:    Lipid Panel    Comprehensive Metabolic Panel

## 2025-01-14 NOTE — PROGRESS NOTES
Subjective   The ABCs of the Annual Wellness Visit  Medicare Wellness Visit      Radha Ballard is a 69 y.o. patient who presents for a Medicare Wellness Visit.    The following portions of the patient's history were reviewed and   updated as appropriate: allergies, current medications, past family history, past medical history, past social history, past surgical history, and problem list.    Compared to one year ago, the patient's physical   health is the same.  Compared to one year ago, the patient's mental   health is the same.    Recent Hospitalizations:  She was not admitted to the hospital during the last year.     Current Medical Providers:  Patient Care Team:  Gretta Canada MD as PCP - General (Family Medicine)  Yasir, Juana Nolan MD (Neurology)  Violetta Singh OD (Optometry)    Outpatient Medications Prior to Visit   Medication Sig Dispense Refill    Cholecalciferol (VITAMIN D3) 2000 units tablet Daily.      Cholecalciferol 10 MCG (400 UNIT) capsule       latanoprost (XALATAN) 0.005 % ophthalmic solution Administer 1 drop to both eyes Every Night.      meloxicam (MOBIC) 15 MG tablet TAKE 1 TABLET BY MOUTH DAILY 90 tablet 1    Multiple Vitamins-Minerals (MULTIVITAMIN WOMEN 50+ PO) Take 1 tablet by mouth Every Night.      multivitamin with minerals tablet tablet       nebivolol (BYSTOLIC) 20 MG tablet TAKE 1 TABLET DAILY 90 tablet 3    nystatin (MYCOSTATIN) 475713 UNIT/GM ointment Apply 1 application topically to the appropriate area as directed 2 (Two) Times a Day. 30 g 1    Red Yeast Rice Extract 600 MG capsule Take  by mouth.      Semaglutide-Weight Management 0.25 MG/0.5ML solution auto-injector Inject 0.5 mL under the skin into the appropriate area as directed 1 (One) Time Per Week. 3 mL 1    Timolol Maleate, Once-Daily, 0.5 % solution INSTILL 1 DROP IN BOTH EYES EVERY MORNING      Vumerity 231 MG capsule delayed-release Take 1 capsule by mouth 2 (Two) Times a Day.       No  "facility-administered medications prior to visit.     No opioid medication identified on active medication list. I have reviewed chart for other potential  high risk medication/s and harmful drug interactions in the elderly.      Aspirin is not on active medication list.  Aspirin use is not indicated based on review of current medical condition/s. Risk of harm outweighs potential benefits.  .    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    Hip pain, right    Hypertension    Lymphocytopenia    Multiple sclerosis    Neuritis    Osteoarthritis of right hip    Atopic dermatitis    Other screening mammogram    Postmenopausal status    Scoliosis of lumbar spine    Encounter for general adult medical examination without abnormal findings    Status post right hip replacement    Acute pain of left knee    Acute cystitis with hematuria    Acute metabolic encephalopathy    Functional diarrhea    Medicare annual wellness visit, subsequent    Hyperlipidemia    Uterine leiomyoma    Seborrheic keratosis    Morbid (severe) obesity due to excess calories     Advance Care Planning Advance Directive is not on file.  ACP discussion was held with the patient during this visit. Patient does not have an advance directive, information provided.            Objective   Vitals:    01/14/25 0932   BP: 145/84   BP Location: Left arm   Patient Position: Sitting   Cuff Size: Large Adult   Pulse: 53   Resp: 18   Temp: 97.5 °F (36.4 °C)   TempSrc: Temporal   SpO2: 97%   Weight: 94.8 kg (209 lb)   Height: 167.6 cm (66\")   PainSc: 0-No pain       Estimated body mass index is 33.73 kg/m² as calculated from the following:    Height as of this encounter: 167.6 cm (66\").    Weight as of this encounter: 94.8 kg (209 lb).  Physical Exam  Vitals and nursing note reviewed.   Constitutional:       General: She is not in acute distress.     Appearance: Normal appearance. She is " well-developed.      Comments: In motorized wheelchair   HENT:      Head: Normocephalic.   Eyes:      General: Lids are normal.      Conjunctiva/sclera: Conjunctivae normal.   Neck:      Thyroid: No thyroid mass or thyromegaly.      Trachea: Trachea normal.   Cardiovascular:      Rate and Rhythm: Normal rate and regular rhythm.      Heart sounds: Normal heart sounds.   Pulmonary:      Effort: Pulmonary effort is normal.      Breath sounds: Normal breath sounds.   Abdominal:      Palpations: Abdomen is soft.   Musculoskeletal:      Cervical back: Normal range of motion.      Right lower leg: No edema.      Left lower leg: No edema.   Lymphadenopathy:      Cervical: No cervical adenopathy.   Skin:     General: Skin is warm and dry.   Neurological:      Mental Status: She is alert and oriented to person, place, and time.   Psychiatric:         Attention and Perception: She is attentive.         Mood and Affect: Mood normal.         Speech: Speech normal.         Behavior: Behavior normal.                     Does the patient have evidence of cognitive impairment? No                                                                                               Health  Risk Assessment    Smoking Status:  Social History     Tobacco Use   Smoking Status Former    Current packs/day: 0.25    Average packs/day: 0.3 packs/day for 5.2 years (1.3 ttl pk-yrs)    Types: Cigarettes   Smokeless Tobacco Never   Tobacco Comments    quit at age 28     Alcohol Consumption:  Social History     Substance and Sexual Activity   Alcohol Use Yes    Alcohol/week: 3.0 standard drinks of alcohol    Types: 3 Glasses of wine per week    Comment: socially       Fall Risk Screen  STEADI Fall Risk Assessment was completed, and patient is at MODERATE risk for falls. Assessment completed on:1/14/2025    Depression Screening   Little interest or pleasure in doing things? Not at all   Feeling down, depressed, or hopeless? Not at all   PHQ-2 Total Score 0       Health Habits and Functional and Cognitive Screenin/13/2025    10:21 AM   Functional & Cognitive Status   Do you have difficulty preparing food and eating? No    Do you have difficulty bathing yourself, getting dressed or grooming yourself? No    Do you have difficulty using the toilet? No    Do you have difficulty moving around from place to place? No    Do you have trouble with steps or getting out of a bed or a chair? Yes    Current Diet Well Balanced Diet    Dental Exam Up to date    Eye Exam Up to date    Exercise (times per week) 0 times per week    Current Exercises Include No Regular Exercise    Do you need help using the phone?  No    Are you deaf or do you have serious difficulty hearing?  No    Do you need help to go to places out of walking distance? No    Do you need help shopping? No    Do you need help preparing meals?  No    Do you need help with housework?  Yes    Do you need help with laundry? No    Do you need help taking your medications? No    Do you need help managing money? No    Do you ever drive or ride in a car without wearing a seat belt? No    Have you felt unusual stress, anger or loneliness in the last month? No    Who do you live with? Spouse    If you need help, do you have trouble finding someone available to you? No    Have you been bothered in the last four weeks by sexual problems? No    Do you have difficulty concentrating, remembering or making decisions? No        Patient-reported           Age-appropriate Screening Schedule:  Refer to the list below for future screening recommendations based on patient's age, sex and/or medical conditions. Orders for these recommended tests are listed in the plan section. The patient has been provided with a written plan.    Health Maintenance List  Health Maintenance   Topic Date Due    DXA SCAN  12/10/2022    MAMMOGRAM  2024    LIPID PANEL  2025    PAP SMEAR  2025    BMI FOLLOWUP  2025    ANNUAL WELLNESS  VISIT  01/14/2026    COLORECTAL CANCER SCREENING  11/09/2031    TDAP/TD VACCINES (3 - Td or Tdap) 10/03/2034    HEPATITIS C SCREENING  Completed    COVID-19 Vaccine  Completed    INFLUENZA VACCINE  Completed    Pneumococcal Vaccine 65+  Completed    ZOSTER VACCINE  Completed                                                                                                                                                CMS Preventative Services Quick Reference  Risk Factors Identified During Encounter  Inactivity/Sedentary: Patient was advised to exercise at least 150 minutes a week per CDC recommendations.  Dental Screening Recommended  Vision Screening Recommended    The above risks/problems have been discussed with the patient.  Pertinent information has been shared with the patient in the After Visit Summary.  An After Visit Summary and PPPS were made available to the patient.    Follow Up:   Next Medicare Wellness visit to be scheduled in 1 year.     Assessment & Plan  Medicare annual wellness visit, subsequent         Other screening mammogram    Orders:    Mammo Screening Digital Tomosynthesis Bilateral With CAD; Future    Postmenopausal status    Orders:    DEXA Bone Density Axial; Future    Hyperlipidemia, unspecified hyperlipidemia type   Lipid abnormalities are stable    Plan:  Continue same medication/s without change.      Counseled patient on lifestyle modifications to help control hyperlipidemia.     Patient Treatment Goals:   LDL goal is under 100    Followup at the next regular appointment.    Orders:    Lipid Panel    Comprehensive Metabolic Panel         Follow Up:   No follow-ups on file.

## 2025-01-15 RX ORDER — ROSUVASTATIN CALCIUM 10 MG/1
10 TABLET, COATED ORAL DAILY
Qty: 90 TABLET | Refills: 3 | Status: SHIPPED | OUTPATIENT
Start: 2025-01-15

## 2025-01-23 ENCOUNTER — TREATMENT (OUTPATIENT)
Dept: PHYSICAL THERAPY | Facility: CLINIC | Age: 70
End: 2025-01-23
Payer: MEDICARE

## 2025-01-23 DIAGNOSIS — R26.89 BALANCE DISORDER: ICD-10-CM

## 2025-01-23 DIAGNOSIS — R29.898 LEG WEAKNESS, BILATERAL: ICD-10-CM

## 2025-01-23 DIAGNOSIS — G35 MULTIPLE SCLEROSIS: Primary | ICD-10-CM

## 2025-01-23 NOTE — PROGRESS NOTES
Physical Therapy Initial Evaluation and Plan of Care      Patient: Radha Ballard   : 1955  Diagnosis/ICD-10 Code:  Multiple sclerosis [G35]; Balance disorder [R26.89]; Leg weakness, bilateral [R29.898.    Referring practitioner: Gretta Canada MD  Date of Initial Visit: 2025  Radha Ballard was seen by Saud Nichole PT at [facility] THER  [facility] PHYSICAL THERAPY   [facility] [place] [address]  Fax [phone]  Phone [phone]   Today's Date: 2025  Patient seen for 1 sessions           Subjective Questionnaire: ABC: 14      Subjective Evaluation    History of Present Illness  Onset date: 8 years ago since getting power WC.  Mechanism of injury: Pt reports to OP PT with B LE weakness with prior PT for same complaints; has been in power wheel chair which she is in 100% of the time other than sleeping and bathroom transfers. Chiropractic care: pain in shoulder and neck. Able to drive with modified van; spouse supportive and able to assist as needed; very independent.  1 fall due to slipping on sock with leg going out from under her - sustained no injury.    C/c reported: Difficulty Walking; Difficulty with daily activities; Falls/history of falls; Muscle weakness. Walks with Rolator short distances in home.     PMH and pertinent information reviewed in Epic.               Patient Occupation: retired Quality of life: excellent    Pain  Current pain ratin  At best pain ratin  At worst pain ratin  Location: right side of lower back  Quality: sharp and tight  Relieving factors: change in position  Aggravating factors: prolonged positioning    Social Support  Lives in: one-story house ([place]amp)  Lives with: spouse    Hand dominance: right    Diagnostic Tests  MRI studies: abnormal (see yellow sticky note)    Treatments  Previous treatment: physical therapy  Current treatment: chiropractic and physical therapy  Patient Goals  Patient goals for therapy:  decreased edema, decreased pain, increased strength, independence with ADLs/IADLs, increased motion and improved balance  Patient goal: increase LE strength to transfer easier and maintain independence and lose weight           Objective          Strength/Myotome Testing     Left Hip   Planes of Motion   Flexion: 4  Extension: 4-  Abduction: 4-    Right Hip   Planes of Motion   Flexion: 3+  Extension: 3+  Abduction: 4-    Left Knee   Flexion: 4  Extension: 4-  Quadriceps contraction: fair    Right Knee   Flexion: 3+  Extension: 4-  Quadriceps contraction: fair    Left Ankle/Foot   Dorsiflexion: 3  Plantar flexion: 3  Inversion: 3  Eversion: 3    Right Ankle/Foot   Dorsiflexion: 4  Plantar flexion: 4  Inversion: 4  Eversion: 4    Additional Strength Details  Ankle DF R 4-/5 and L 4+/5    Ambulation   Weight-Bearing Status   Assistive device used: two-wheeled walker    Additional Weight-Bearing Status Details  Power wheelchair: SPS tranfers independently    Comments   2 min walk test: 60 ft  Speed: 60/120 = 0.5 ft/sec          Assessment & Plan       Assessment  Impairments: abnormal coordination, abnormal gait, abnormal muscle firing, abnormal muscle tone, abnormal or restricted ROM, activity intolerance, impaired balance, impaired physical strength, lacks appropriate home exercise program, safety issue and weight-bearing intolerance   Functional limitations: walking, moving in bed, standing and unable to perform repetitive tasks   Assessment details: The patient is a 69 y.o. female who presents to physical therapy today for B LE weakness. Upon initial evaluation, the patient demonstrates the following impairments: generalized LE (R>L) weakness, weight-bear and balance intolerance, standing intolerance, gait and transfer/movement quality deficits.     Due to these impairments, the patient is unable to walk except short distances, stand and balance impaired while performing ADLs. The patient would benefit from skilled  PT services to address functional limitations and impairments and to improve patient quality of life.    Barriers to therapy: MS and easily dislocating R hip  Prognosis: fair    Goals  Plan Goals: STGs (4 weeks):  1.  Pt will be able to verbalize understanding of HEP.  2.  Pt will be able to stand for 2 minutes without UE support to allow safe and effective performance of ADLs.  3.  Pt will demonstrate 4/5 B hip extension, abduction and flexion.  4. Increase distance and speed on 2 min walk test by 15%    LTGs (12 weeks):    1.  Pt will be I with finalized HEP.  2.  Pt will be able to stand unsupported for 3 minutes while performing UE movements to simulate putting away dishes and brushing teeth.    3.  Pt will demonstrate 4+/5 B hip abduction and extension and B ankle DF and eversion.    4.  Increase distance and speed on 2 min walk test by 50%    Plan  Therapy options: will be seen for skilled therapy services  Planned modality interventions: dry needling, TENS, electrical stimulation/Russian stimulation, thermotherapy (hydrocollator packs), ultrasound and cryotherapy  Planned therapy interventions: abdominal trunk stabilization, ADL retraining, balance/weight-bearing training, body mechanics training, flexibility, functional ROM exercises, gait training, home exercise program, IADL retraining, manual therapy, motor coordination training, neuromuscular re-education, postural training, soft tissue mobilization, strengthening, stretching, therapeutic activities and transfer training  Frequency: 2x week  Duration in visits: 12  Treatment plan discussed with: patient        History # of Personal Factors and/or Comorbidities: MODERATE (1-2)  Examination of Body System(s): # of elements: MODERATE (3)  Clinical Presentation: EVOLVING  Clinical Decision Making: MODERATE      Timed:         Manual Therapy:         mins  07592;     Therapeutic Exercise:    20     mins  57557;     Neuromuscular Shelby:        mins  34888;     Therapeutic Activity:          mins  83344;     Gait Training:           mins  48895;     Ultrasound:          mins  67782;    Ionto                                   mins   62675  Self Care                            mins   15369  Aquatic                               mins 90252      Un-Timed:  Canalith Repositioning __ mins 53870  Electrical Stimulation:         mins  25166 ( );  Dry Needling          mins self-pay  Traction         mins 92485  Low Eval          Mins  30247  Mod Eval    35      Mins  86459  High Eval                            Mins  14719  Re-Eval                               mins  13634        Timed Treatment:   20   mins   Total Treatment:      55  mins    PT SIGNATURE: Saud Nichole PT, DPT  [place] License#: [id number]       Electronically signed by Saud Nichole PT, 01/23/25, 12:32 PM EST      Medicare Initial Certification  Certification Period:  1/23/2025 thru 4/22/2025  I certify that the therapy services are furnished while this patient is under my care.  The services outlined above are required by this patient, and will be reviewed every 90 days.       Physician Signature:__________________________________________________    PHYSICIAN: Gretta Canada MD      DATE:     Please sign and return via fax to [phone].. Thank you, [facility] Physical Therapy.

## 2025-01-28 ENCOUNTER — TREATMENT (OUTPATIENT)
Dept: PHYSICAL THERAPY | Facility: CLINIC | Age: 70
End: 2025-01-28
Payer: MEDICARE

## 2025-01-28 DIAGNOSIS — G35 MULTIPLE SCLEROSIS: Primary | ICD-10-CM

## 2025-01-28 DIAGNOSIS — R29.898 LEG WEAKNESS, BILATERAL: ICD-10-CM

## 2025-01-28 DIAGNOSIS — R26.89 BALANCE DISORDER: ICD-10-CM

## 2025-01-28 PROCEDURE — 97110 THERAPEUTIC EXERCISES: CPT | Performed by: PHYSICAL THERAPIST

## 2025-01-28 PROCEDURE — 97530 THERAPEUTIC ACTIVITIES: CPT | Performed by: PHYSICAL THERAPIST

## 2025-01-28 NOTE — PROGRESS NOTES
Physical Therapy Daily Note      Radha Ballard was seen by Saud Nichole, PT at [facility] [address] [facility] PHYSICAL THERAPY  [address] [facility] [place] [address]  Fax [phone]  Phone [phone]       Diagnosis Plan   1. Multiple sclerosis        2. Balance disorder        3. Leg weakness, bilateral            VISIT#: 2  Referring practitioner: Gretta Canada MD    Subjective   Radha Ballard reports: has not been to [facility] yet, but plans to start going. No change since exam. Beginning light therapy at chiropractic clinic for neuropathy.      Objective     See Exercise, Manual, and Modality Logs for complete treatment.     Patient Education:    Assessment/Plan  - R LE weaker than L secondary to MS  - requires rest intermittently due to MS and weakness.    Goals  Plan Goals: STGs (4 weeks):  1.  Pt will be able to verbalize understanding of HEP.  2.  Pt will be able to stand for 2 minutes without UE support to allow safe and effective performance of ADLs.  3.  Pt will demonstrate 4/5 B hip extension, abduction and flexion.  4. Increase distance and speed on 2 min walk test by 15%     LTGs (12 weeks):    1.  Pt will be I with finalized HEP.  2.  Pt will be able to stand unsupported for 3 minutes while performing UE movements to simulate putting away dishes and brushing teeth.    3.  Pt will demonstrate 4+/5 B hip abduction and extension and B ankle DF and eversion.    4.  Increase distance and speed on 2 min walk test by 50%         Progress per Plan of Care            Timed:         Manual Therapy:         mins  90280;     Therapeutic Exercise:     25    mins  89809;     Neuromuscular Shelby:        mins  68916;    Therapeutic Activity:     22     mins  42536;     Gait Training:           mins  08316;     Ultrasound:          mins  23837;    Ionto                                   mins   06524  Self Care                            mins   64345  Aquatic                              mins  05943    Un-Timed:  Canalith Repos                   mins  41200  Electrical Stimulation:         mins  47243 ( );  Dry Needling          mins self-pay  Traction          mins 55392  Low Eval          Mins  02292  Mod Eval          Mins  24175  High Eval                           Mins  69416  Re-Eval                               mins  08587    Timed Treatment:   47   mins   Total Treatment:     47   mins    Saud Nichole PT PT, DPT, [place] License #: [id number]

## 2025-01-30 ENCOUNTER — TREATMENT (OUTPATIENT)
Dept: PHYSICAL THERAPY | Facility: CLINIC | Age: 70
End: 2025-01-30
Payer: MEDICARE

## 2025-01-30 DIAGNOSIS — G35 MULTIPLE SCLEROSIS: Primary | ICD-10-CM

## 2025-01-30 DIAGNOSIS — R26.89 BALANCE DISORDER: ICD-10-CM

## 2025-01-30 DIAGNOSIS — R29.898 LEG WEAKNESS, BILATERAL: ICD-10-CM

## 2025-01-30 NOTE — PROGRESS NOTES
Physical Therapy Daily Treatment Note  [address] [facility], [address] [place] [address]     Patient: Radha Ballard   : 1955  Referring practitioner: Gretta Canada MD  Diagnosis:      ICD-10-CM ICD-9-CM   1. Multiple sclerosis  G35 340   2. Balance disorder  R26.89 781.99   3. Leg weakness, bilateral  R29.898 729.89     Date of Initial Visit: Type: THERAPY  Noted: 2025  Today's Date: 2025    VISIT#: 3          Subjective   Radha Ballard reports: had a good workout last session.    Objective   See Exercise, Manual, and Modality Logs for complete treatment.       Assessment & Plan       Assessment  Assessment details: Increased functional strengthening and prone position to stretch hf and strengthen hips.  R hip weaker than L.            Progress per Plan of Care and Progress strengthening /stabilization /functional activity           Timed:  Manual Therapy:         mins  31356;  Therapeutic Exercise:    15     mins  66801;     Neuromuscular Shelby:        mins  94369;    Therapeutic Activity:     15     mins  67602;     Gait Training:           mins  90153;     Ultrasound:          mins  37581;    Electrical Stimulation:         mins  95368 ( );    Untimed:  Electrical Stimulation:         mins  12996 ( );  Mechanical Traction:         mins  94172;   Dry needling:       Self pay    Timed Treatment:   30   mins   Total Treatment:     30   mins  Mary Michaels PT, DPT, CLT, CIDN  Physical Therapist

## 2025-02-04 ENCOUNTER — TREATMENT (OUTPATIENT)
Dept: PHYSICAL THERAPY | Facility: CLINIC | Age: 70
End: 2025-02-04
Payer: MEDICARE

## 2025-02-04 DIAGNOSIS — R26.89 BALANCE DISORDER: ICD-10-CM

## 2025-02-04 DIAGNOSIS — G35 MULTIPLE SCLEROSIS: Primary | ICD-10-CM

## 2025-02-04 DIAGNOSIS — R29.898 LEG WEAKNESS, BILATERAL: ICD-10-CM

## 2025-02-04 PROCEDURE — 97530 THERAPEUTIC ACTIVITIES: CPT | Performed by: PHYSICAL THERAPIST

## 2025-02-04 PROCEDURE — 97110 THERAPEUTIC EXERCISES: CPT | Performed by: PHYSICAL THERAPIST

## 2025-02-04 NOTE — PROGRESS NOTES
Physical Therapy Daily Treatment Note  [address] [facility], [address] [place] [address]     Patient: Radha Ballard   : 1955  Referring practitioner: Gretta Canada MD  Diagnosis:      ICD-10-CM ICD-9-CM   1. Multiple sclerosis  G35 340   2. Balance disorder  R26.89 781.99   3. Leg weakness, bilateral  R29.898 729.89     Date of Initial Visit: Type: THERAPY  Noted: 2025  Today's Date: 2025    VISIT#: 4          Subjective   Radha Ballard reports: went to [facility] to supplement exercise - used pool.    Objective   See Exercise, Manual, and Modality Logs for complete treatment.       Assessment & Plan       Assessment  Assessment details: Extremely tight hip flexors and quads - continued functional exercise and stretching to improve activity tolerance and promote increased strength.          Progress per Plan of Care and Progress strengthening /stabilization /functional activity           Timed:  Manual Therapy:         mins  59733;  Therapeutic Exercise:    25    mins  85936;     Neuromuscular Shelby:        mins  05944;    Therapeutic Activity:     15     mins  38596;     Gait Training:           mins  12089;     Ultrasound:          mins  27080;    Electrical Stimulation:         mins  51011 ( );    Untimed:  Electrical Stimulation:         mins  91595 ( );  Mechanical Traction:         mins  09923;   Dry needling:       Self pay    Timed Treatment:   40   mins   Total Treatment:     40   mins  Saud Nichole PT, DPT  Physical Therapist  [place] License #: [id number]

## 2025-02-06 ENCOUNTER — TREATMENT (OUTPATIENT)
Dept: PHYSICAL THERAPY | Facility: CLINIC | Age: 70
End: 2025-02-06
Payer: MEDICARE

## 2025-02-06 DIAGNOSIS — R29.898 LEG WEAKNESS, BILATERAL: ICD-10-CM

## 2025-02-06 DIAGNOSIS — R26.89 BALANCE DISORDER: ICD-10-CM

## 2025-02-06 DIAGNOSIS — G35 MULTIPLE SCLEROSIS: Primary | ICD-10-CM

## 2025-02-06 NOTE — PROGRESS NOTES
Health Maintenance Summary     Topic Due On Due Status Completed On    Colorectal Cancer Screening - Colonoscopy Mar 10, 1996 Overdue     Medicare Wellness Visit Mar 10, 2011 Overdue     IMMUNIZATION - DTaP/Tdap/Td Mar 10, 1965 Overdue     Immunization-Influenza Sep 1, 2018 Not Due     Depression Screening Mar 10, 1958 Overdue     Hepatitis C Screening Mar 10, 1997 Overdue      Mar 10, 2011 Overdue           Patient is due for topics as listed above, he wishes to discuss with provider .             Physical Therapy Daily Treatment Note   [facility], [address] [place] [address]     Patient: Radha Ballard   : 1955  Referring practitioner: Gretta Canada MD  Diagnosis:      ICD-10-CM ICD-9-CM   1. Multiple sclerosis  G35 340   2. Balance disorder  R26.89 781.99   3. Leg weakness, bilateral  R29.898 729.89     Date of Initial Visit: Type: THERAPY  Noted: 2025  Today's Date: 2025    VISIT#: 5          Subjective   Radha Ballard reports: doing well with treatment; voiced no complaints.    Objective   See Exercise, Manual, and Modality Logs for complete treatment.       Assessment & Plan       Assessment  Assessment details: Patient more fatigued with less endurance today - advised to rest tomorrow: functional exercise and stretching to improve activity tolerance and promote increased strength to facilitate walking, shopping, and other related activities.Initiated therapy early.          Progress per Plan of Care and Progress strengthening /stabilization /functional activity           Timed:  Manual Therapy:         mins  09662;  Therapeutic Exercise:    20    mins  59020;     Neuromuscular Shelby:        mins  80964;    Therapeutic Activity:     15     mins  50448;     Gait Training:           mins  85752;     Ultrasound:          mins  52136;    Electrical Stimulation:         mins  16279 ( );    Untimed:  Electrical Stimulation:         mins  94074 ( );  Mechanical Traction:         mins  44887;   Dry needling:       Self pay    Timed Treatment:   35   mins   Total Treatment:     35   mins  Saud Nichole PT, DPT  Physical Therapist  [place] License #: [id number]

## 2025-02-13 ENCOUNTER — TREATMENT (OUTPATIENT)
Dept: PHYSICAL THERAPY | Facility: CLINIC | Age: 70
End: 2025-02-13
Payer: MEDICARE

## 2025-02-13 DIAGNOSIS — R26.89 BALANCE DISORDER: ICD-10-CM

## 2025-02-13 DIAGNOSIS — G35 MULTIPLE SCLEROSIS: Primary | ICD-10-CM

## 2025-02-13 DIAGNOSIS — R29.898 LEG WEAKNESS, BILATERAL: ICD-10-CM

## 2025-02-13 NOTE — PROGRESS NOTES
Physical Therapy Daily Treatment Note   [facility], [address] [place] [address]     Patient: Radha Ballard   : 1955  Referring practitioner: Gretta Canada MD  Diagnosis:      ICD-10-CM ICD-9-CM   1. Multiple sclerosis  G35 340   2. Balance disorder  R26.89 781.99   3. Leg weakness, bilateral  R29.898 729.89     Date of Initial Visit: Type: THERAPY  Noted: 2025  Today's Date: 2025    VISIT#: 6          Subjective   Radha Ballard reports: less fatigue today and going to [facility].    Objective   See Exercise, Manual, and Modality Logs for complete treatment.       Assessment & Plan       Assessment  Assessment details: Experienced difficulty with step ups: decreased reps. Performed more mat exercises. Discussed stretching hips and LB in pool at [facility]: may need specific instruction later.          Progress per Plan of Care and Progress strengthening /stabilization /functional activity           Timed:  Manual Therapy:         mins  59784;  Therapeutic Exercise:    25    mins  75978;     Neuromuscular Shelby:        mins  45857;    Therapeutic Activity:     15     mins  87114;     Gait Training:           mins  69832;     Ultrasound:          mins  92135;    Electrical Stimulation:         mins  27837 ( );    Untimed:  Electrical Stimulation:         mins  89017 ( );  Mechanical Traction:         mins  66959;   Dry needling:       Self pay    Timed Treatment:   40   mins   Total Treatment:     40   mins  Saud Nichole PT, DPT  Physical Therapist  [place] License #: [id number]

## 2025-02-18 ENCOUNTER — TELEPHONE (OUTPATIENT)
Dept: PHYSICAL THERAPY | Facility: CLINIC | Age: 70
End: 2025-02-18
Payer: MEDICARE

## 2025-02-20 ENCOUNTER — TREATMENT (OUTPATIENT)
Dept: PHYSICAL THERAPY | Facility: CLINIC | Age: 70
End: 2025-02-20
Payer: MEDICARE

## 2025-02-20 DIAGNOSIS — G35 MULTIPLE SCLEROSIS: Primary | ICD-10-CM

## 2025-02-20 DIAGNOSIS — R29.898 LEG WEAKNESS, BILATERAL: ICD-10-CM

## 2025-02-20 DIAGNOSIS — R26.89 BALANCE DISORDER: ICD-10-CM

## 2025-02-20 NOTE — PROGRESS NOTES
Physical Therapy Daily Treatment Note  [address] [facility], [address] [place] [address]     Patient: Radha Ballard   : 1955  Referring practitioner: Gretta Canada MD  Diagnosis:      ICD-10-CM ICD-9-CM   1. Multiple sclerosis  G35 340   2. Balance disorder  R26.89 781.99   3. Leg weakness, bilateral  R29.898 729.89     Date of Initial Visit: Type: THERAPY  Noted: 2025  Today's Date: 2025    VISIT#: 7          Subjective   Radha Ballard reports: no pain today.  She reports she was able to stand the whole time while loading the dryer when she usually would sit down to do this.      Objective   See Exercise, Manual, and Modality Logs for complete treatment.       Assessment & Plan       Assessment  Assessment details: Added neck and chest stretches for tight muscles today.  Prone positioning exercises performed to decreased mm tension from seated positioning in wc.            Progress per Plan of Care and Progress strengthening /stabilization /functional activity           Timed:  Manual Therapy:         mins  54117;  Therapeutic Exercise:    15     mins  63341;     Neuromuscular Shelby:    15    mins  64789;    Therapeutic Activity:     10     mins  44776;     Gait Training:           mins  75530;     Ultrasound:          mins  05967;    Electrical Stimulation:         mins  39538 ( );    Untimed:  Electrical Stimulation:         mins  21559 ( );  Mechanical Traction:         mins  88362;   Dry needling:       Self pay    Timed Treatment:   40   mins   Total Treatment:     40   mins  Mary Michaels, PT, DPT, CLT, CIDN  Physical Therapist

## 2025-02-27 ENCOUNTER — TREATMENT (OUTPATIENT)
Dept: PHYSICAL THERAPY | Facility: CLINIC | Age: 70
End: 2025-02-27
Payer: MEDICARE

## 2025-02-27 DIAGNOSIS — R26.89 BALANCE DISORDER: ICD-10-CM

## 2025-02-27 DIAGNOSIS — G35 MULTIPLE SCLEROSIS: Primary | ICD-10-CM

## 2025-02-27 DIAGNOSIS — R29.898 LEG WEAKNESS, BILATERAL: ICD-10-CM

## 2025-02-27 NOTE — PROGRESS NOTES
Physical Therapy Daily Treatment Note  [address] [facility], [address] [place] [address]     Patient: Radha Ballard   : 1955  Referring practitioner: Gretta Canada MD  Diagnosis:      ICD-10-CM ICD-9-CM   1. Multiple sclerosis  G35 340   2. Balance disorder  R26.89 781.99   3. Leg weakness, bilateral  R29.898 729.89     Date of Initial Visit: Type: THERAPY  Noted: 2025  Today's Date: 2025    VISIT#: 8          Subjective   Radha Ballard reports: some glute mm soreness today.    Objective   See Exercise, Manual, and Modality Logs for complete treatment.       Assessment & Plan       Assessment  Assessment details: Pt has glute weakness with PT assist needed today for prone hip extensions and hs curls.            Progress per Plan of Care and Progress strengthening /stabilization /functional activity           Timed:  Manual Therapy:         mins  74070;  Therapeutic Exercise:    15     mins  61108;     Neuromuscular Shelby:    8    mins  65891;    Therapeutic Activity:     8     mins  85612;     Gait Training:           mins  29536;     Ultrasound:          mins  23973;    Electrical Stimulation:         mins  05226 ( );    Untimed:  Electrical Stimulation:         mins  07549 ( );  Mechanical Traction:         mins  71789;   Dry needling:       Self pay    Timed Treatment:   31   mins   Total Treatment:     31   mins  Mary Michaels PT, DPT, CLT, CIDN  Physical Therapist

## 2025-03-04 ENCOUNTER — TREATMENT (OUTPATIENT)
Dept: PHYSICAL THERAPY | Facility: CLINIC | Age: 70
End: 2025-03-04
Payer: MEDICARE

## 2025-03-04 DIAGNOSIS — G35 MULTIPLE SCLEROSIS: Primary | ICD-10-CM

## 2025-03-04 NOTE — PROGRESS NOTES
Physical Therapy Daily Progress Note      Patient: Radha Ballard   : 1955  Diagnosis/ICD-10 Code:  Multiple sclerosis [G35]  Referring practitioner: Gretta Canada MD  Date of Initial Visit: Type: THERAPY  Noted: 2025  Today's Date: 3/4/2025  Patient seen for 9 sessions         Radha Ballard reports: Pain in the right shoulder muscle. She has done the stretches at home for it, unsure of cause.       Subjective     Objective   See Exercise, Manual, and Modality Logs for complete treatment.       Assessment & Plan       Assessment  Assessment details: Able to progress resistance on NuStep today. She performed STS from wheelchair due to difficulty performing from NuStep seat. Responds well to stretching in prone. Would benefit from continued anterior stretching and posterior strengthening.        Progress per Plan of Care and Progress strengthening /stabilization /functional activity           Manual Therapy:         mins  82412;  Therapeutic Exercise:    26     mins  58160;     Neuromuscular Shelby:        mins  78463;    Therapeutic Activity:     4     mins  41053;     Gait Training:           mins  72681;     Ultrasound:          mins  58689;    Electrical Stimulation:         mins  16614 ( );  Dry Needling          mins self-pay    Timed Treatment:   30   mins   Total Treatment:     30   mins    Rose Grover PT  Physical Therapist

## 2025-03-11 ENCOUNTER — TREATMENT (OUTPATIENT)
Dept: PHYSICAL THERAPY | Facility: CLINIC | Age: 70
End: 2025-03-11
Payer: MEDICARE

## 2025-03-11 DIAGNOSIS — R26.89 BALANCE DISORDER: Chronic | ICD-10-CM

## 2025-03-11 DIAGNOSIS — G35 MULTIPLE SCLEROSIS: Primary | Chronic | ICD-10-CM

## 2025-03-11 DIAGNOSIS — R29.898 LEG WEAKNESS, BILATERAL: Chronic | ICD-10-CM

## 2025-03-11 NOTE — PROGRESS NOTES
Physical Therapy Daily Progress Note      Patient: Radha Ballard   : 1955  Diagnosis/ICD-10 Code:  Multiple sclerosis [G35]  Referring practitioner: Gretta Canada MD  Date of Initial Visit: Type: THERAPY  Noted: 2025  Today's Date: 3/11/2025  Patient seen for 10 sessions         Radha Ballard reports: Patient is tired today due to little sleep. She had an MRI for her MS. Reports that her brain lesion is not any different than on 6 months ago scan.         Subjective     Objective   See Exercise, Manual, and Modality Logs for complete treatment.       Assessment & Plan       Assessment  Assessment details: Patient able to complete 10 step ups on RLE today, though with much weightbearing through railings. She tolerated lower extremity strengthening well with intermittent seated rest breaks, and continues to enjoy stretching.         Progress per Plan of Care and Progress strengthening /stabilization /functional activity           Manual Therapy:  10       mins  77873;  Therapeutic Exercise:    23     mins  31215;     Neuromuscular Shelby:        mins  51975;    Therapeutic Activity:     9     mins  84524;     Gait Training:          mins  68507;     Ultrasound:          mins  98937;    Electrical Stimulation:         mins  96592 ( );  Dry Needling          mins self-pay    Timed Treatment:   42   mins   Total Treatment:     42   mins    Rose Grover PT  Physical Therapist

## 2025-03-12 ENCOUNTER — RESULTS FOLLOW-UP (OUTPATIENT)
Dept: FAMILY MEDICINE CLINIC | Facility: CLINIC | Age: 70
End: 2025-03-12
Payer: MEDICARE

## 2025-03-13 ENCOUNTER — TREATMENT (OUTPATIENT)
Dept: PHYSICAL THERAPY | Facility: CLINIC | Age: 70
End: 2025-03-13
Payer: MEDICARE

## 2025-03-13 DIAGNOSIS — R29.898 LEG WEAKNESS, BILATERAL: ICD-10-CM

## 2025-03-13 DIAGNOSIS — G35 MULTIPLE SCLEROSIS: Primary | ICD-10-CM

## 2025-03-13 DIAGNOSIS — R26.89 BALANCE DISORDER: ICD-10-CM

## 2025-03-13 NOTE — PROGRESS NOTES
Physical Therapy Progress  Note/Progress Note    [facility] [address] [place] [address]     Patient: Radha Ballard   : 1955  Referring practitioner: Gretta Canada MD  Date of Initial Visit: Type: THERAPY  Noted: 2025  Today's Date: 3/13/2025    VISIT#: 11          Subjective Evaluation    Pain  Current pain ratin  At best pain ratin  At worst pain ratin         Radha Ballard reports: no pain today.      Objective          Strength/Myotome Testing     Left Hip   Planes of Motion   Flexion: 2+  Extension: 3+  Abduction: 4+  Adduction: 4+    Right Hip   Planes of Motion   Flexion: 4  Extension: 3-  Abduction: 4-  Adduction: 4+    Additional Strength Details  In sitting      See Exercise, Manual, and Modality Logs for complete treatment.       Assessment & Plan       Assessment  Assessment details: Pt reports 25- 30% overall improvement, noting she can put away dishes standing and reaching with greater ease, improved balance and standing tolerance.  She [facility]  She had 12.8% confidence level on the ABC today, compared to 14% at initial eval, but feels subjectively improved with her balance.     Goals  Plan Goals: STGs (4 weeks):  1.  Pt will be able to verbalize understanding of HEP.- MET  2.  Pt will be able to stand for 2 minutes without UE support to allow safe and effective performance of ADLs. - NOT MET with 20 seconds without UE use only  3.  Pt will demonstrate 4/5 B hip extension, abduction and flexion. MET for some  4. Increase distance and speed on 2 min walk test by 15%    LTGs (12 weeks):    1.  Pt will be I with finalized HEP.  2.  Pt will be able to stand unsupported for 3 minutes while performing UE movements to simulate putting away dishes and brushing teeth.    3.  Pt will demonstrate 4+/5 B hip abduction and extension and B ankle DF and eversion.    4.  Increase distance and speed on 2 min walk test by  50%        Plan  Therapy options: will be seen for skilled therapy services          Progress per Plan of Care and Progress strengthening /stabilization /functional activity           Timed:  Manual Therapy:    5     mins  18811;  Therapeutic Exercise:    10      mins  73061;     Neuromuscular Shelby:        mins  75127;    Therapeutic Activity:     15     mins  44969;     Gait Training:           mins  65927;     Ultrasound:          mins  69022;    Electrical Stimulation:         mins  49154 ( );    Untimed:  Electrical Stimulation:         mins  81914 ( );  Mechanical Traction:         mins  97821;   Dry needling:       Self pay  Re-eval:  5    mins 96591    Timed Treatment:   30   mins   Total Treatment:     35   mins  Mary Michaels PT, DPT, CLT, LUPEN  Physical Therapist

## 2025-03-18 ENCOUNTER — TREATMENT (OUTPATIENT)
Dept: PHYSICAL THERAPY | Facility: CLINIC | Age: 70
End: 2025-03-18
Payer: MEDICARE

## 2025-03-18 DIAGNOSIS — R26.89 BALANCE DISORDER: ICD-10-CM

## 2025-03-18 DIAGNOSIS — R29.898 LEG WEAKNESS, BILATERAL: ICD-10-CM

## 2025-03-18 DIAGNOSIS — Z12.31 OTHER SCREENING MAMMOGRAM: ICD-10-CM

## 2025-03-18 DIAGNOSIS — G35 MULTIPLE SCLEROSIS: Primary | ICD-10-CM

## 2025-03-18 NOTE — PROGRESS NOTES
Physical Therapy Daily Treatment Note   [facility], [address] [place] [address]     Patient: Radha Ballard   : 1955  Referring practitioner: Gretta Canada MD  Diagnosis:      ICD-10-CM ICD-9-CM   1. Multiple sclerosis  G35 340   2. Balance disorder  R26.89 781.99   3. Leg weakness, bilateral  R29.898 729.89   4. Other screening mammogram  Z12.31 V76.12     Date of Initial Visit: Type: THERAPY  Noted: 2025  Today's Date: 3/18/2025    VISIT#: 12          Subjective   Radha Ballard reports: no pain today and states she was cleaning the living room china cabinet while standing (not in her power chair) for the first time in a long time.      Objective   See Exercise, Manual, and Modality Logs for complete treatment.       Assessment & Plan       Assessment  Assessment details: Pt tolerated session with decreased mm tightness and 45 seconds of standing tolerance today.            Progress per Plan of Care and Progress strengthening /stabilization /functional activity           Timed:  Manual Therapy:    8     mins  62995;  Therapeutic Exercise:    10     mins  42703;     Neuromuscular Shelby:    10    mins  22833;    Therapeutic Activity:     10     mins  33123;     Gait Training:           mins  97737;     Ultrasound:          mins  92662;    Electrical Stimulation:         mins  99071 ( );    Untimed:  Electrical Stimulation:         mins  74948 ( );  Mechanical Traction:         mins  65675;   Dry needling:       Self pay    Timed Treatment:   38   mins   Total Treatment:     38   mins  Mary Michaels PT, DPT, CLT, CIDN  Physical Therapist

## 2025-03-20 ENCOUNTER — TREATMENT (OUTPATIENT)
Dept: PHYSICAL THERAPY | Facility: CLINIC | Age: 70
End: 2025-03-20
Payer: MEDICARE

## 2025-03-20 DIAGNOSIS — G35 MULTIPLE SCLEROSIS: Primary | ICD-10-CM

## 2025-03-20 DIAGNOSIS — R26.89 BALANCE DISORDER: ICD-10-CM

## 2025-03-20 DIAGNOSIS — R29.898 LEG WEAKNESS, BILATERAL: ICD-10-CM

## 2025-03-20 DIAGNOSIS — Z12.31 OTHER SCREENING MAMMOGRAM: ICD-10-CM

## 2025-03-20 NOTE — PROGRESS NOTES
Physical Therapy Daily Treatment Note  [address] [facility], [address] [place] [address]     Patient: Radha Ballard   : 1955  Referring practitioner: Gretta Canada MD  Diagnosis:      ICD-10-CM ICD-9-CM   1. Multiple sclerosis  G35 340   2. Balance disorder  R26.89 781.99   3. Leg weakness, bilateral  R29.898 729.89   4. Other screening mammogram  Z12.31 V76.12     Date of Initial Visit: Type: THERAPY  Noted: 2025  Today's Date: 3/20/2025    VISIT#: 13          Subjective   Radha Ballard reports: she fell onto the shower chair getting into the tub yesterday morning.      Objective   See Exercise, Manual, and Modality Logs for complete treatment.       Assessment & Plan       Assessment  Assessment details: Standing tolerance challenged with cues to push knees straight and engage muscles.            Progress per Plan of Care and Progress strengthening /stabilization /functional activity           Timed:  Manual Therapy:         mins  25857;  Therapeutic Exercise:    15     mins  27403;     Neuromuscular Shelby:    8    mins  88065;    Therapeutic Activity:     15     mins  03332;     Gait Training:           mins  74287;     Ultrasound:          mins  16490;    Electrical Stimulation:         mins  64576 ( );    Untimed:  Electrical Stimulation:         mins  59097 ( );  Mechanical Traction:         mins  55086;   Dry needling:       Self pay    Timed Treatment:   38   mins   Total Treatment:     38   mins  Mary Michaels PT, DPT, CLT, CIDN  Physical Therapist

## 2025-03-25 ENCOUNTER — TELEPHONE (OUTPATIENT)
Dept: PHYSICAL THERAPY | Facility: CLINIC | Age: 70
End: 2025-03-25

## 2025-03-27 ENCOUNTER — TREATMENT (OUTPATIENT)
Dept: PHYSICAL THERAPY | Facility: CLINIC | Age: 70
End: 2025-03-27
Payer: MEDICARE

## 2025-03-27 DIAGNOSIS — R26.89 BALANCE DISORDER: ICD-10-CM

## 2025-03-27 DIAGNOSIS — R29.898 LEG WEAKNESS, BILATERAL: ICD-10-CM

## 2025-03-27 DIAGNOSIS — G35 MULTIPLE SCLEROSIS: Primary | ICD-10-CM

## 2025-03-27 NOTE — PROGRESS NOTES
Physical Therapy Daily Treatment Note  [address] [facility], [address] [place] [address]     Patient: Radha Ballard   : 1955  Referring practitioner: Gretta Canada MD  Diagnosis:      ICD-10-CM ICD-9-CM   1. Multiple sclerosis  G35 340   2. Balance disorder  R26.89 781.99   3. Leg weakness, bilateral  R29.898 729.89     Date of Initial Visit: Type: THERAPY  Noted: 2025  Today's Date: 3/27/2025    VISIT#: 14          Subjective   Radha Ballard reports: no pain today.      Objective   See Exercise, Manual, and Modality Logs for complete treatment.       Assessment & Plan       Assessment  Assessment details: Pt tolerated session well today, but needed to leave for an appointment in [place] so had limited time.            Progress per Plan of Care and Progress strengthening /stabilization /functional activity           Timed:  Manual Therapy:         mins  27989;  Therapeutic Exercise:    15     mins  13939;     Neuromuscular Shelby:        mins  20109;    Therapeutic Activity:     15     mins  63466;     Gait Training:           mins  90015;     Ultrasound:          mins  23382;    Electrical Stimulation:         mins  88796 ( );    Untimed:  Electrical Stimulation:         mins  99076 ( );  Mechanical Traction:         mins  91380;   Dry needling:       Self pay    Timed Treatment:   30   mins   Total Treatment:     30   mins  Mary Michaels PT, DPT, CLT, CIDN  Physical Therapist

## 2025-04-01 ENCOUNTER — TREATMENT (OUTPATIENT)
Dept: PHYSICAL THERAPY | Facility: CLINIC | Age: 70
End: 2025-04-01
Payer: MEDICARE

## 2025-04-01 DIAGNOSIS — R26.89 BALANCE DISORDER: ICD-10-CM

## 2025-04-01 DIAGNOSIS — Z12.31 OTHER SCREENING MAMMOGRAM: ICD-10-CM

## 2025-04-01 DIAGNOSIS — R29.898 LEG WEAKNESS, BILATERAL: ICD-10-CM

## 2025-04-01 DIAGNOSIS — G35 MULTIPLE SCLEROSIS: Primary | ICD-10-CM

## 2025-04-01 PROCEDURE — 97110 THERAPEUTIC EXERCISES: CPT | Performed by: PHYSICAL THERAPIST

## 2025-04-01 PROCEDURE — 97140 MANUAL THERAPY 1/> REGIONS: CPT | Performed by: PHYSICAL THERAPIST

## 2025-04-01 NOTE — PROGRESS NOTES
Physical Therapy Daily Treatment Note  [address] [facility], [address] [place] [address]     Patient: Radha Ballard   : 1955  Referring practitioner: Gretta Canada MD  Diagnosis:      ICD-10-CM ICD-9-CM   1. Multiple sclerosis  G35 340   2. Balance disorder  R26.89 781.99   3. Leg weakness, bilateral  R29.898 729.89   4. Other screening mammogram  Z12.31 V76.12     Date of Initial Visit: Type: THERAPY  Noted: 2025  Today's Date: 2025    VISIT#: 15          Subjective   Radha Ballard reports: she feels she is having an exacerbation of her MS as she has increased fatigue at 7/10 fatigue level today and increased leg weakness.      Objective   See Exercise, Manual, and Modality Logs for complete treatment.       Assessment & Plan       Assessment  Assessment details: Decreased intensity of exercises today and did not perform Nustep due to high level of fatigue and MS exacerbation.  Prone exercises and stretching to decrease quad/hf tightness performed with pt leaving with decreased mm tightness and fatigue.            Progress per Plan of Care and Progress strengthening /stabilization /functional activity           Timed:  Manual Therapy:    8     mins  35187;  Therapeutic Exercise:    15     mins  53061;     Neuromuscular Shelby:    5    mins  72472;    Therapeutic Activity:          mins  96718;     Gait Training:           mins  46860;     Ultrasound:          mins  96940;    Electrical Stimulation:         mins  20821 ( );    Untimed:  Electrical Stimulation:         mins  82515 ( );  Mechanical Traction:         mins  22634;   Dry needling:       Self pay    Timed Treatment:   28   mins   Total Treatment:     28   mins  Mary Michaels PT, DPT, CLT, CIDN  Physical Therapist

## 2025-04-03 ENCOUNTER — TELEPHONE (OUTPATIENT)
Dept: PHYSICAL THERAPY | Facility: CLINIC | Age: 70
End: 2025-04-03

## 2025-04-03 NOTE — TELEPHONE ENCOUNTER
Caller: Radha Ballard    Relationship: Self      What was the call regarding: DUE TO WEATHER

## 2025-04-03 NOTE — DISCHARGE INSTR - DIET
Aryan Guevara MD  Cardiology    [facility]    [address] [place] [address], [place] [address]     TEL  [phone]  [facility]     04/03/25     It was my pleasure to see Kita Vo today.     Kita Vo is a 79 y.o. female with a history of chronic atrial fibrillation on warfarin therapy, hypertension, lymphedema, gastric bypass, who presents for follow up.     At her last visit, she reported walking in the house with the walker - only very short distances and uses a chair that pushes her up. She is typically in a wheelchair when she is out of the house. Arthritis has been severe recently and limited by her arthritis and her significant lymphedema. Has had 12 prior operations - orthopedic.  Lymphedema stable. Minimal wounds that are healing.    Since that visit she has been feeling well. She has a lot of sinus issues with the weather changes. No chest pain, shortness of breath, light headedness. Very little mobility even at home. Sits most of the day.     Daughter and grand daughter recently moved in with them. They are bringing in a lot of goodies.      ECG from today personally reviewed and discussed with the patient shows atrial fibrillation.     Assessment/Plan     Chronic atrial fibrillation  She is systemically anticoagulated with warfarin.  She is consistently therapeutic and her INR is typically in the low 2s. She prefers to use Warfarin.  We discussed usage of DOAC therapy and the requirement of no additional lab work, however, she has been stable on her dosing of warfarin and prefers to do once monthly INR checks as she is tolerating this medications for a long time.  She is rate controlled and no additional AV paulette blockade is required at this time.    Hypertension  She is currently on hydrochlorothiazide 25 mg daily.  Her blood pressure today is 122/78.   - Continue HCTZ 25 mg daily.    ASCVD risk assessment  I have no prior lipid indices for review.   Healthy Heart Diet   She is not currently on statin therapy.  No cross-sectional imaging.   04/23/24 00:00   Cholesterol 183   Triglycerides 85   HDL 63   LDL Calculated 104 (H)   - Check lipid panel with her next INR check.    Lymphedema  She has severe lower extremity lymphedema.  This is a chronic issue for her.  She states that she has no current active wounds but there are healing wounds on her anterior lower legs.  She does seek treatment and has a home therapeutic machine.  She should continue leg elevation and if able compression.      It was my pleasure to visit with Kita Vo in clinic today.  Please do not hesitate to contact me with any questions.      Sincerely,    ________________  Aryan Guevara MD    I attest that this visit supports the complexity inherent to evaluation and management associated with medical care services that serve as the continuing focal point for all needed health care services and/or medical care services that are part of ongoing care related to this patient's single, serious condition or a complex condition.    Patient seen and discussed with fellow/resident.     Cardiovascular History:    Past Medical History:  Past Medical History:   Diagnosis Date    Chronic atrial fibrillation (CMS/HCC)     Lymphedema        Past Surgical History:  Past Surgical History   Procedure Laterality Date    Cataract extraction, bilateral      Cosmetic surgery      Facelift; liposuction knees; panniculectomy    Gastric bypass      Lumbar wound debridement      Posterior laminectomy / decompression lumbar spine      Replacement total hip lateral position Left     Replacement total knee Left     Total shoulder replacement Right     Vaginal prolapse repair         Medications:  Current Outpatient Medications   Medication Sig Dispense Refill    ascorbic acid (VITAMIN C) 500 mg tablet Take 500 mg by mouth daily.      biotin 1,000 mcg tablet,chewable Take 1 tablet by mouth daily.      calcium carbonate-vitamin D3 600  mg-10 mcg (400 unit) per tablet Take 1 tablet by mouth daily.      cyanocobalamin (VITAMIN B12) 1,000 mcg tablet take 1 tablet by oral route  every day      hydrochlorothiazide (HYDRODIURIL) 25 mg tablet Take 25 mg by mouth daily.      multivit-iron-min-folic acid (CENTRUM) 3,500-18-0.4 unit-mg-mg tablet,chewable Take 1 tablet by mouth daily.      warfarin (COUMADIN) 5 mg tablet Take as directed based on INR result. 90 tablet 1     No current facility-administered medications for this visit.       Allergies: Acetaminophen, Aspirin, Celecoxib, Nsaids (non-steroidal anti-inflammatory drug), Oxycodone, Rofecoxib, and Tramadol    Social History:  Social History     Tobacco Use    Smoking status: Never    Smokeless tobacco: Never   Vaping Use    Vaping status: Never Used   Substance Use Topics    Alcohol use: No    Drug use: Defer       Family History:    No family history on file.      Review of Systems: A complete 14-point review of systems is negative, except as noted in the HPI.    Exam:  Objective   There were no vitals filed for this visit.  There is no height or weight on file to calculate BMI.  Wt Readings from Last 3 Encounters:   04/04/24 118 kg (260 lb)   03/02/23 122 kg (270 lb)   12/12/19 122 kg (270 lb)       Physical Exam  Constitutional:       Appearance: She is well-developed.   HENT:      Head: Atraumatic.   Eyes:      General: No scleral icterus.  Neck:      Vascular: No JVD.      Trachea: No tracheal deviation.   Cardiovascular:      Rate and Rhythm: Normal rate and regular rhythm.      Heart sounds: Normal heart sounds. No murmur heard.     No friction rub.   Pulmonary:      Effort: Pulmonary effort is normal. No respiratory distress.      Breath sounds: Normal breath sounds. No wheezing or rales.   Abdominal:      Palpations: Abdomen is soft.      Tenderness: There is no abdominal tenderness. There is no guarding.   Musculoskeletal:         General: Swelling (tense edema) present.   Skin:      General: Skin is warm and dry.   Neurological:      Mental Status: She is alert.          Labs: Personally reviewed and discussed with the patient.  Notable for the following.   Lab Results   Component Value Date    LDLCALC 104 (H) 04/23/2024    CHOL 183 04/23/2024    TRIG 85 04/23/2024    HDL 63 04/23/2024     09/29/2016    K 3.8 09/29/2016    BUN 14 09/29/2016    CREATININE 0.5 (L) 09/29/2016    WBC 8.35 09/29/2016    HGB 10.1 (L) 09/29/2016     09/29/2016       Cardiovascular Studies:

## 2025-04-08 ENCOUNTER — TREATMENT (OUTPATIENT)
Dept: PHYSICAL THERAPY | Facility: CLINIC | Age: 70
End: 2025-04-08
Payer: MEDICARE

## 2025-04-08 DIAGNOSIS — G35 MULTIPLE SCLEROSIS: Primary | ICD-10-CM

## 2025-04-08 DIAGNOSIS — R26.89 BALANCE DISORDER: ICD-10-CM

## 2025-04-08 DIAGNOSIS — R29.898 LEG WEAKNESS, BILATERAL: ICD-10-CM

## 2025-04-08 NOTE — PROGRESS NOTES
Physical Therapy Daily Treatment Note  [address] [facility], [address] [place] [address]     Patient: Radha Ballard   : 1955  Referring practitioner: Gretta Canada MD  Diagnosis:      ICD-10-CM ICD-9-CM   1. Multiple sclerosis  G35 340   2. Leg weakness, bilateral  R29.898 729.89   3. Balance disorder  R26.89 781.99     Date of Initial Visit: Type: THERAPY  Noted: 2025  Today's Date: 2025    VISIT#: 16          Subjective   Radha Ballard reports: Patient reports she thinks she is having an exacerbation of her MS, has been fatigued and weak lately. She has not had issues with safe transfers. Has been sleeping more. Feels about 1 week into exacerbation, usually last 3-4 weeks.     Objective   See Exercise, Manual, and Modality Logs for complete treatment.       Assessment & Plan       Assessment  Assessment details: Did intervals with rest on NuStep today d/t fatigue. Weakness of right ankle today. Used techniques of alternating sides or rest between repetitions d/t muscle weakness. Min assist required for prone to sitting transition at end of session.          Progress per Plan of Care           Timed:  Manual Therapy:    6     mins  22977;  Therapeutic Exercise:    16     mins  13819;     Neuromuscular Shelby:        mins  64182;    Therapeutic Activity:     8     mins  72266;     Gait Training:           mins  68394;     Ultrasound:          mins  34886;    Electrical Stimulation:         mins  26245 ( );    Untimed:  Electrical Stimulation:         mins  47198 ( );  Mechanical Traction:         mins  90482;   Dry needling:       Self pay    Timed Treatment:   30   mins   Total Treatment:     30   mins  Rose Grover, PT, DPT, CLT, CIDN  Physical Therapist

## 2025-04-11 DIAGNOSIS — Z12.11 COLON CANCER SCREENING: Primary | ICD-10-CM

## 2025-04-22 ENCOUNTER — TREATMENT (OUTPATIENT)
Dept: PHYSICAL THERAPY | Facility: CLINIC | Age: 70
End: 2025-04-22
Payer: MEDICARE

## 2025-04-22 DIAGNOSIS — R26.89 BALANCE DISORDER: ICD-10-CM

## 2025-04-22 DIAGNOSIS — G35 MULTIPLE SCLEROSIS: Primary | ICD-10-CM

## 2025-04-22 DIAGNOSIS — Z12.31 OTHER SCREENING MAMMOGRAM: ICD-10-CM

## 2025-04-22 DIAGNOSIS — R29.898 LEG WEAKNESS, BILATERAL: ICD-10-CM

## 2025-04-22 PROCEDURE — 97164 PT RE-EVAL EST PLAN CARE: CPT | Performed by: PHYSICAL THERAPIST

## 2025-04-22 PROCEDURE — 97110 THERAPEUTIC EXERCISES: CPT | Performed by: PHYSICAL THERAPIST

## 2025-04-22 NOTE — PROGRESS NOTES
Re-Assessment / Re-Certification        Patient: Radha Ballard   : 1955  Diagnosis/ICD-10 Code:  Multiple sclerosis [G35]  Referring practitioner: Gretta Canada MD  Date of Initial Visit: Type: THERAPY  Noted: 2025  Today's Date: 2025  Patient seen for 17 sessions      Subjective:   Radha Ballard reports: Had some right posterior hip pain this morning that has since gone away. Patient thinks she is coming out of the flare up, having slightly more energy and feeling less forgetful.   Subjective Questionnaire: ABC: 25.6 % confidence  Clinical Progress: unchanged  Home Program Compliance: Yes  Treatment has included: therapeutic exercise, neuromuscular re-education, manual therapy, and therapeutic activity    Subjective   Objective          Strength/Myotome Testing     Left Hip   Planes of Motion   Flexion: 4  Extension: 3+  Abduction: 4+  Adduction: 4+  External rotation: 4  Internal rotation: 4    Right Hip   Planes of Motion   Flexion: 3-  Extension: 3-  Abduction: 4-  Adduction: 4+  External rotation: 3-  Internal rotation: 3-    Additional Strength Details  In sitting    Functional Assessment     Comments  Standing with bilateral upper extremity support: 2 minutes  Standing unsupported: unable today      Assessment & Plan       Assessment  Impairments: abnormal gait, abnormal muscle firing, abnormal or restricted ROM, activity intolerance, impaired balance and impaired physical strength   Functional limitations: walking, moving in bed and standing   Assessment details: Patient with limited strength improvement between last progress note and today as result of going through an MS flare up. Her ABC balance confidence score positively improved from 14% to 25.6%. Required Shana for R leg transferring from seated to sidelying today.   Prognosis: fair    Goals  Plan Goals: STGs (4 weeks):  1.  Pt will be able to verbalize understanding of HEP.- MET  2.  Pt will be able to stand for 2 minutes  without UE support to allow safe and effective performance of ADLs. - NOT MET with 20 seconds without UE use only  3.  Pt will demonstrate 4/5 B hip extension, abduction and flexion. MET for some  4. Increase distance and speed on 2 min walk test by 15% - NOT MET    LTGs (12 weeks):    1.  Pt will be I with finalized HEP.  2.  Pt will be able to stand unsupported for 3 minutes while performing UE movements to simulate putting away dishes and brushing teeth.    3.  Pt will demonstrate 4+/5 B hip abduction and extension and B ankle DF and eversion.    4.  Increase distance and speed on 2 min walk test by 50%        Plan  Therapy options: will be seen for skilled therapy services  Planned modality interventions: TENS, thermotherapy (hydrocollator packs), electrical stimulation/Russian stimulation and cryotherapy  Planned therapy interventions: abdominal trunk stabilization, manual therapy, ADL retraining, balance/weight-bearing training, neuromuscular re-education, motor coordination training, body mechanics training, postural training, soft tissue mobilization, spinal/joint mobilization, strengthening, stretching, therapeutic activities, transfer training, joint mobilization, home exercise program, gait training, functional ROM exercises and flexibility  Frequency: 2x week  Duration in weeks: 12  Treatment plan discussed with: patient      Progress toward previous goals: Partially Met        Recommendations: Continue as planned  Timeframe: 12 weeks  Prognosis to achieve goals: fair    PT Signature: Rose Grover PT      Based upon review of the patient's progress and continued therapy plan, it is my medical opinion that Radha Ballard should continue physical therapy treatment at [facility] [address] [facility] PHYSICAL THERAPY  [address] [facility] [place] [address] [phone].    Signature: __________________________________  Gretta Canada MD    Manual Therapy:         mins  47909;  Therapeutic  Exercise:    18     mins  03792;     Neuromuscular Shelby:        mins  44403;    Therapeutic Activity:          mins  74845;     Gait Training:           mins  80552;     Ultrasound:          mins  07314;    Electrical Stimulation:         mins  89929 ( );  Dry Needling          mins self-pay    Re-eval:   12 mins  53973     Timed Treatment:   18   mins   Total Treatment:     30   mins

## 2025-05-23 ENCOUNTER — TRANSCRIBE ORDERS (OUTPATIENT)
Dept: ADMINISTRATIVE | Facility: HOSPITAL | Age: 70
End: 2025-05-23
Payer: MEDICARE

## 2025-05-23 ENCOUNTER — LAB (OUTPATIENT)
Dept: LAB | Facility: HOSPITAL | Age: 70
End: 2025-05-23
Payer: MEDICARE

## 2025-05-23 DIAGNOSIS — G35 MULTIPLE SCLEROSIS: Primary | ICD-10-CM

## 2025-05-23 DIAGNOSIS — G35 MULTIPLE SCLEROSIS: ICD-10-CM

## 2025-05-23 LAB
ALBUMIN SERPL-MCNC: 4.3 G/DL (ref 3.5–5.2)
ALBUMIN/GLOB SERPL: 2.2 G/DL
ALP SERPL-CCNC: 82 U/L (ref 39–117)
ALT SERPL W P-5'-P-CCNC: 27 U/L (ref 1–33)
ANION GAP SERPL CALCULATED.3IONS-SCNC: 6.7 MMOL/L (ref 5–15)
AST SERPL-CCNC: 24 U/L (ref 1–32)
BILIRUB SERPL-MCNC: 0.3 MG/DL (ref 0–1.2)
BUN SERPL-MCNC: 17 MG/DL (ref 8–23)
BUN/CREAT SERPL: 24.6 (ref 7–25)
CALCIUM SPEC-SCNC: 9.2 MG/DL (ref 8.6–10.5)
CHLORIDE SERPL-SCNC: 105 MMOL/L (ref 98–107)
CO2 SERPL-SCNC: 27.3 MMOL/L (ref 22–29)
CREAT SERPL-MCNC: 0.69 MG/DL (ref 0.57–1)
EGFRCR SERPLBLD CKD-EPI 2021: 94.1 ML/MIN/1.73
GLOBULIN UR ELPH-MCNC: 2 GM/DL
GLUCOSE SERPL-MCNC: 105 MG/DL (ref 65–99)
POTASSIUM SERPL-SCNC: 4.4 MMOL/L (ref 3.5–5.2)
PROT SERPL-MCNC: 6.3 G/DL (ref 6–8.5)
SODIUM SERPL-SCNC: 139 MMOL/L (ref 136–145)

## 2025-05-23 PROCEDURE — 80053 COMPREHEN METABOLIC PANEL: CPT

## 2025-05-23 PROCEDURE — 86480 TB TEST CELL IMMUN MEASURE: CPT

## 2025-05-23 PROCEDURE — 36415 COLL VENOUS BLD VENIPUNCTURE: CPT

## 2025-05-24 ENCOUNTER — HOSPITAL ENCOUNTER (EMERGENCY)
Facility: HOSPITAL | Age: 70
Discharge: HOME | End: 2025-05-24
Attending: EMERGENCY MEDICINE
Payer: MEDICARE

## 2025-05-24 VITALS
TEMPERATURE: 97.7 F | HEART RATE: 65 BPM | SYSTOLIC BLOOD PRESSURE: 133 MMHG | DIASTOLIC BLOOD PRESSURE: 82 MMHG | RESPIRATION RATE: 18 BRPM | HEIGHT: 66 IN | BODY MASS INDEX: 33.11 KG/M2 | WEIGHT: 206 LBS | OXYGEN SATURATION: 99 %

## 2025-05-24 DIAGNOSIS — I10 ACCELERATED HYPERTENSION: Primary | ICD-10-CM

## 2025-05-24 DIAGNOSIS — G35 MULTIPLE SCLEROSIS: ICD-10-CM

## 2025-05-24 LAB
ALBUMIN SERPL-MCNC: 4 G/DL (ref 3.5–5.2)
ALBUMIN/GLOB SERPL: 1.8 G/DL
ALP SERPL-CCNC: 82 U/L (ref 39–117)
ALT SERPL W P-5'-P-CCNC: 27 U/L (ref 1–33)
ANION GAP SERPL CALCULATED.3IONS-SCNC: 10.2 MMOL/L (ref 5–15)
AST SERPL-CCNC: 24 U/L (ref 1–32)
BASOPHILS # BLD AUTO: 0.03 10*3/MM3 (ref 0–0.2)
BASOPHILS NFR BLD AUTO: 0.7 % (ref 0–1.5)
BILIRUB SERPL-MCNC: 0.3 MG/DL (ref 0–1.2)
BUN SERPL-MCNC: 17 MG/DL (ref 8–23)
BUN/CREAT SERPL: 24.3 (ref 7–25)
CALCIUM SPEC-SCNC: 9.5 MG/DL (ref 8.6–10.5)
CHLORIDE SERPL-SCNC: 105 MMOL/L (ref 98–107)
CO2 SERPL-SCNC: 26.8 MMOL/L (ref 22–29)
CREAT SERPL-MCNC: 0.7 MG/DL (ref 0.57–1)
DEPRECATED RDW RBC AUTO: 43.8 FL (ref 37–54)
EGFRCR SERPLBLD CKD-EPI 2021: 93.8 ML/MIN/1.73
EOSINOPHIL # BLD AUTO: 0.12 10*3/MM3 (ref 0–0.4)
EOSINOPHIL NFR BLD AUTO: 2.8 % (ref 0.3–6.2)
ERYTHROCYTE [DISTWIDTH] IN BLOOD BY AUTOMATED COUNT: 13.2 % (ref 12.3–15.4)
GEN 5 1HR TROPONIN T REFLEX: <6 NG/L
GLOBULIN UR ELPH-MCNC: 2.2 GM/DL
GLUCOSE SERPL-MCNC: 142 MG/DL (ref 65–99)
HCT VFR BLD AUTO: 43.6 % (ref 34–46.6)
HGB BLD-MCNC: 14.2 G/DL (ref 12–15.9)
IMM GRANULOCYTES # BLD AUTO: 0.01 10*3/MM3 (ref 0–0.05)
IMM GRANULOCYTES NFR BLD AUTO: 0.2 % (ref 0–0.5)
LYMPHOCYTES # BLD AUTO: 0.7 10*3/MM3 (ref 0.7–3.1)
LYMPHOCYTES NFR BLD AUTO: 16.2 % (ref 19.6–45.3)
MCH RBC QN AUTO: 29.6 PG (ref 26.6–33)
MCHC RBC AUTO-ENTMCNC: 32.6 G/DL (ref 31.5–35.7)
MCV RBC AUTO: 90.8 FL (ref 79–97)
MONOCYTES # BLD AUTO: 0.27 10*3/MM3 (ref 0.1–0.9)
MONOCYTES NFR BLD AUTO: 6.3 % (ref 5–12)
NEUTROPHILS NFR BLD AUTO: 3.19 10*3/MM3 (ref 1.7–7)
NEUTROPHILS NFR BLD AUTO: 73.8 % (ref 42.7–76)
NRBC BLD AUTO-RTO: 0 /100 WBC (ref 0–0.2)
PLATELET # BLD AUTO: 174 10*3/MM3 (ref 140–450)
PMV BLD AUTO: 10 FL (ref 6–12)
POTASSIUM SERPL-SCNC: 3.9 MMOL/L (ref 3.5–5.2)
PROT SERPL-MCNC: 6.2 G/DL (ref 6–8.5)
QT INTERVAL: 438 MS
QTC INTERVAL: 407 MS
RBC # BLD AUTO: 4.8 10*6/MM3 (ref 3.77–5.28)
SODIUM SERPL-SCNC: 142 MMOL/L (ref 136–145)
TROPONIN T NUMERIC DELTA: NORMAL
TROPONIN T SERPL HS-MCNC: <6 NG/L
WBC NRBC COR # BLD AUTO: 4.32 10*3/MM3 (ref 3.4–10.8)

## 2025-05-24 PROCEDURE — 93005 ELECTROCARDIOGRAM TRACING: CPT

## 2025-05-24 PROCEDURE — 96374 THER/PROPH/DIAG INJ IV PUSH: CPT

## 2025-05-24 PROCEDURE — 85025 COMPLETE CBC W/AUTO DIFF WBC: CPT | Performed by: EMERGENCY MEDICINE

## 2025-05-24 PROCEDURE — 25010000002 HYDRALAZINE PER 20 MG: Performed by: EMERGENCY MEDICINE

## 2025-05-24 PROCEDURE — 80053 COMPREHEN METABOLIC PANEL: CPT | Performed by: EMERGENCY MEDICINE

## 2025-05-24 PROCEDURE — 93005 ELECTROCARDIOGRAM TRACING: CPT | Performed by: EMERGENCY MEDICINE

## 2025-05-24 PROCEDURE — 84484 ASSAY OF TROPONIN QUANT: CPT | Performed by: EMERGENCY MEDICINE

## 2025-05-24 PROCEDURE — 36415 COLL VENOUS BLD VENIPUNCTURE: CPT

## 2025-05-24 PROCEDURE — 99283 EMERGENCY DEPT VISIT LOW MDM: CPT

## 2025-05-24 RX ORDER — HYDRALAZINE HYDROCHLORIDE 20 MG/ML
20 INJECTION INTRAMUSCULAR; INTRAVENOUS ONCE
Status: COMPLETED | OUTPATIENT
Start: 2025-05-24 | End: 2025-05-24

## 2025-05-24 RX ADMIN — HYDRALAZINE HYDROCHLORIDE 20 MG: 20 INJECTION INTRAMUSCULAR; INTRAVENOUS at 18:41

## 2025-05-24 NOTE — ED NOTES
Pt reports taking her BP at home and it was reading as 200s/100s. Pt does report a hx of HTN and states she does take BP meds for this, and she has taken this today. Pt denies any chest pain or SOA. Pt denies any headache or vision issues.

## 2025-05-25 NOTE — DISCHARGE INSTRUCTIONS
Avoid activity that would be considered exertional for you in the next 24 hours  Check your blood pressure randomly twice a day for the next week and keep a record.  Do not sequentially check your blood pressure  Make sure to continue your Bystolic/Nebivolol  Close follow-up with your primary care provider is essential

## 2025-05-25 NOTE — ED PROVIDER NOTES
"Subjective   History of Present Illness  70-year-old female history of multiple sclerosis recently has noticed elevation of blood pressure the patient reports that she was in the 200/95 range earlier today she states she has had no headache she denies any change in neuromuscular status.  She reports that she has had no chest pain or diaphoresis.  She reports no focal neurologic defects or lateralizing neurologic signs she denies neck pain or stiffness.  She states she has not recently had changes in her medication and reports no recent courses of steroids      Review of Systems   Eyes:  Negative for visual disturbance.   Respiratory:  Negative for chest tightness and shortness of breath.    Cardiovascular:  Negative for chest pain, palpitations and leg swelling.   Hematological:  Does not bruise/bleed easily.       Past Medical History:   Diagnosis Date    Arthritis     Colon polyp     Elevated cholesterol     Encounter for Zostavax administration     Glaucoma 07/01/2023    Using istanoprost opthhalmic solution    H/O bladder infections     History of hepatitis A vaccination 2018    History of lymphopenia     due ro Lemtrada    Hyperlipidemia     Hypertension     Meningitis 1969    Multiple sclerosis     Neuromuscular disorder     Obesity     Optic neuritis     Sleep apnea 09/01/2016    Bi-pap machine    Spinal stenosis     Visual impairment        Allergies   Allergen Reactions    Amlodipine Besylate Swelling    Losartan Rash    Lisinopril Cough       Past Surgical History:   Procedure Laterality Date    BREAST BIOPSY Right 1998    benign    CATARACT EXTRACTION, BILATERAL  03/2019    COLONOSCOPY      Unsure of date \"Don't Remember.\"    COLONOSCOPY N/A 11/9/2021    Procedure: COLONOSCOPY to cecum with polypectomy and biopsies (cold bx, cliip);  Surgeon: Pardeep Aponte MD;  Location[facility] ENDOSCOPY;  Service: General;  Laterality: N/A;  pre - change in bowel habits  post - polyps    HIP BIPOLAR REPLACEMENT Left " 01/2014    Dr Stern    LAMINOTOMY      L-3-L-4-L-5 Dr Menezes    OTHER SURGICAL HISTORY      multiple SCLEROSIS plaque    OTHER SURGICAL HISTORY      [facility]-2007 [facility] [place]    SPINE SURGERY      minimally invasive spine surgery- Dr Torres       Family History   Problem Relation Age of Onset    Cancer Mother         breast    Alzheimer's disease Mother     Arthritis Mother     Arthritis Father     Hypertension Father     Hypertension Other     COPD Other        Social History     Socioeconomic History    Marital status:    Tobacco Use    Smoking status: Former     Current packs/day: 0.25     Average packs/day: 0.3 packs/day for 5.2 years (1.3 ttl pk-yrs)     Types: Cigarettes    Smokeless tobacco: Never    Tobacco comments:     quit at age 28   Vaping Use    Vaping status: Never Used   Substance and Sexual Activity    Alcohol use: Yes     Alcohol/week: 3.0 standard drinks of alcohol     Types: 3 Glasses of wine per week     Comment: socially    Drug use: Yes     Types: Marijuana    Sexual activity: Yes     Partners: Female     Birth control/protection: Post-menopausal, None     Comment: For 6-7 years, 20 years ago           Objective   Physical Exam  Alert Tigre Coma Scale 15   HEENT: Pupils equal and reactive to light. Conjunctivae are not injected. Normal tympanic membranes. Oropharynx and nares are normal.   Neck: Supple. Midline trachea. No JVD. No goiter.   Chest: Clear and equal breath sounds bilaterally, regular rate and rhythm without murmur or rub.   Abdomen: Positive bowel sounds, nontender, nondistended. No rebound or peritoneal signs. No CVA tenderness.   Extremities no clubbing. cyanosis or edema. Motor sensory exam is normal. The full range of motion is intact   Skin: Warm and dry, no rashes or petechia.   Lymphatic: No regional lymphadenopathy. No calf pain, swelling or Homans sign    Procedures           ED Course      Labs Reviewed   COMPREHENSIVE METABOLIC PANEL -  Abnormal; Notable for the following components:       Result Value    Glucose 142 (*)     All other components within normal limits    Narrative:     GFR Categories in Chronic Kidney Disease (CKD)              GFR Category          GFR (mL/min/1.73)    Interpretation  G1                    90 or greater        Normal or high (1)  G2                    60-89                Mild decrease (1)  G3a                   45-59                Mild to moderate decrease  G3b                   30-44                Moderate to severe decrease  G4                    15-29                Severe decrease  G5                    14 or less           Kidney failure    (1)In the absence of evidence of kidney disease, neither GFR category G1 or G2 fulfill the criteria for CKD.    eGFR calculation 2021 CKD-EPI creatinine equation, which does not include race as a factor   CBC WITH AUTO DIFFERENTIAL - Abnormal; Notable for the following components:    Lymphocyte % 16.2 (*)     All other components within normal limits   TROPONIN - Normal    Narrative:     High Sensitive Troponin T Reference Range:  <14.0 ng/L- Negative Female for AMI  <22.0 ng/L- Negative Male for AMI  >=14 - Abnormal Female indicating possible myocardial injury.  >=22 - Abnormal Male indicating possible myocardial injury.   Clinicians would have to utilize clinical acumen, EKG, Troponin, and serial changes to determine if it is an Acute Myocardial Infarction or myocardial injury due to an underlying chronic condition.        HIGH SENSITIVITIY TROPONIN T 1HR    Narrative:     High Sensitive Troponin T Reference Range:  <14.0 ng/L- Negative Female for AMI  <22.0 ng/L- Negative Male for AMI  >=14 - Abnormal Female indicating possible myocardial injury.  >=22 - Abnormal Male indicating possible myocardial injury.   Clinicians would have to utilize clinical acumen, EKG, Troponin, and serial changes to determine if it is an Acute Myocardial Infarction or myocardial injury due  to an underlying chronic condition.        CBC AND DIFFERENTIAL    Narrative:     The following orders were created for panel order CBC & Differential.  Procedure                               Abnormality         Status                     ---------                               -----------         ------                     CBC Auto Differential[[id number]]        Abnormal            Final result                 Please view results for these tests on the individual orders.     Medications   hydrALAZINE (APRESOLINE) injection 20 mg (20 mg Intravenous Given 5/24/25 5261)     No radiology results for the last day                                                   Medical Decision Making  Blood pressure responded to Dralzine IV.  The patient was observed.  She was asymptomatic throughout.  The patient was discharged advised to check her blood pressure with an upper arm cuff at random intervals twice a day for the next week.  She is encouraged to follow-up with her primary care provider the patient would appear to be maxed out on Nebivolol given heart rate and dosage.  The patient was stable at discharge and vocalized understanding of discharge instruction warnings and the importance of close follow-up with her primary care provider was specifically discussed    Amount and/or Complexity of Data Reviewed  Labs: ordered.  ECG/medicine tests: ordered.    Risk  Prescription drug management.        Final diagnoses:   Accelerated hypertension   Multiple sclerosis       ED Disposition  ED Disposition       ED Disposition   Discharge    Condition   Stable    Comment   --               Gretta Canada MD  [address] [id number] [phone]               Medication List      No changes were made to your prescriptions during this visit.            Tez Monk MD  05/24/25 5659

## 2025-05-26 LAB
GAMMA INTERFERON BACKGROUND BLD IA-ACNC: 0.03 IU/ML
M TB IFN-G BLD-IMP: NEGATIVE
M TB IFN-G CD4+ BCKGRND COR BLD-ACNC: 0.05 IU/ML
M TB IFN-G CD4+CD8+ BCKGRND COR BLD-ACNC: 0.05 IU/ML
MITOGEN IGNF BCKGRD COR BLD-ACNC: >10 IU/ML
QUANTIFERON INCUBATION: NORMAL
SERVICE CMNT-IMP: NORMAL

## 2025-05-27 LAB
QT INTERVAL: 438 MS
QTC INTERVAL: 407 MS

## 2025-05-29 ENCOUNTER — PATIENT OUTREACH (OUTPATIENT)
Dept: CASE MANAGEMENT | Facility: OTHER | Age: 70
End: 2025-05-29
Payer: MEDICARE

## 2025-05-29 NOTE — OUTREACH NOTE
AMBULATORY CASE MANAGEMENT NOTE    Names and Relationships of Patient/Support Persons: Contact: AVERYjordanbenitezrenetta Radha QIU; Relationship: Self -     Patient Outreach    [facility], seen for accelerated HTN, multiple sclerosis. RN-ACM outreach call made to pt. Explained role of RN-ACM and reason for call. Pt reports improvement in BP. Reviewed ED AVS with pt. Education provided. Pt has PCP follow up appt scheduled. Reviewed SDOH. Pt denies any needs or questions, advised her to call with any. Follow up outreach prn.     Send Education  Questions/Answers      Flowsheet Row Most Recent Value   Annual Wellness Visit:  Patient Has Completed   Other Patient Education/Resources  24/7 [facility] Nurse Call Line   24/7 Nurse Call Line Education Method Verbal   Advanced Directives: --  [resources provided]          SDOH updated and reviewed with the patient during this program:  Financial Resource Strain: Low Risk  (5/29/2025)    Overall Financial Resource Strain (CARDIA)     Difficulty of Paying Living Expenses: Not very hard      --     Food Insecurity: No Food Insecurity (5/29/2025)    Hunger Vital Sign     Worried About Running Out of Food in the Last Year: Never true     Ran Out of Food in the Last Year: Never true      --     Transportation Needs: No Transportation Needs (5/29/2025)    PRAPARE - Transportation     Lack of Transportation (Medical): No     Lack of Transportation (Non-Medical): No         Iram BRIZUELA  Ambulatory Case Management    5/29/2025, 15:31 EDT

## 2025-05-30 ENCOUNTER — OFFICE VISIT (OUTPATIENT)
Dept: FAMILY MEDICINE CLINIC | Facility: CLINIC | Age: 70
End: 2025-05-30
Payer: MEDICARE

## 2025-05-30 VITALS
HEART RATE: 53 BPM | WEIGHT: 206 LBS | HEIGHT: 66 IN | BODY MASS INDEX: 33.11 KG/M2 | OXYGEN SATURATION: 95 % | SYSTOLIC BLOOD PRESSURE: 155 MMHG | DIASTOLIC BLOOD PRESSURE: 72 MMHG | TEMPERATURE: 97.7 F

## 2025-05-30 DIAGNOSIS — I10 PRIMARY HYPERTENSION: Primary | ICD-10-CM

## 2025-05-30 RX ORDER — LOSARTAN POTASSIUM 50 MG/1
50 TABLET ORAL DAILY
Qty: 90 TABLET | Refills: 1 | Status: SHIPPED | OUTPATIENT
Start: 2025-05-30

## 2025-05-30 NOTE — PROGRESS NOTES
"Chief Complaint  Hypertension    Subjective        Radha Ballard presents to [facility]  Hypertension  Chronicity:  Chronic  Onset:  More than 1 year ago  Progression since onset:  Worse  Condition status:  Uncontrolled  Associated symptoms: no anxiety, no blurred vision, no chest pain, no headaches, no malaise/fatigue, no orthopnea, no palpitations, no peripheral edema and no shortness of breath    Agents associated with hypertension:  No associated agents  Compliance problems:  Exercise      Objective   Vital Signs:  /72 (BP Location: Left arm, Patient Position: Sitting, Cuff Size: Adult)   Pulse 53   Temp 97.7 °F (36.5 °C) (Temporal)   Ht 167.6 cm (66\")   Wt 93.4 kg (206 lb)   SpO2 95%   BMI 33.25 kg/m²   Estimated body mass index is 33.25 kg/m² as calculated from the following:    Height as of this encounter: 167.6 cm (66\").    Weight as of this encounter: 93.4 kg (206 lb).            Physical Exam  Vitals and nursing note reviewed.   Constitutional:       General: She is not in acute distress.     Appearance: She is well-developed.   HENT:      Head: Normocephalic.   Eyes:      General: Lids are normal.      Conjunctiva/sclera: Conjunctivae normal.   Neck:      Thyroid: No thyroid mass or thyromegaly.      Trachea: Trachea normal.   Cardiovascular:      Rate and Rhythm: Normal rate and regular rhythm.      Heart sounds: Normal heart sounds.   Pulmonary:      Effort: Pulmonary effort is normal.      Breath sounds: Normal breath sounds.   Musculoskeletal:      Cervical back: Normal range of motion.   Lymphadenopathy:      Cervical: No cervical adenopathy.   Skin:     General: Skin is warm and dry.   Neurological:      Mental Status: She is alert and oriented to person, place, and time.   Psychiatric:         Attention and Perception: She is attentive.         Mood and Affect: Mood normal.         Speech: Speech normal.         Behavior: Behavior normal.      "   Result Review :  The following data was reviewed by: Gretta Canada MD on 05/30/2025:  Common labs          1/14/2025    10:03 5/23/2025    14:42 5/24/2025    18:19   Common Labs   Glucose 98  105  142    BUN 20  17  17    Creatinine 0.65  0.69  0.70    Sodium 138  139  142    Potassium 4.5  4.4  3.9    Chloride 104  105  105    Calcium 9.1  9.2  9.5    Albumin 3.8  4.3  4.0    Total Bilirubin 0.4  0.3  0.3    Alkaline Phosphatase 84  82  82    AST (SGOT) 23  24  24    ALT (SGPT) 26  27  27    WBC   4.32    Hemoglobin   14.2    Hematocrit   43.6    Platelets   174    Total Cholesterol 217      Triglycerides 120      HDL Cholesterol 61      LDL Cholesterol  135                  Assessment and Plan   Diagnoses and all orders for this visit:    1. Primary hypertension (Primary)  Assessment & Plan:  Hypertension is uncontrolled  Medication changes per orders.  Dietary sodium restriction.  Regular aerobic exercise.  Ambulatory blood pressure monitoring.  Blood pressure will be reassessedat the next regular appointment.    Orders:  -     losartan (Cozaar) 50 MG tablet; Take 1 tablet by mouth Daily.  Dispense: 90 tablet; Refill: 1             Follow Up   No follow-ups on file.  Patient was given instructions and counseling regarding her condition or for health maintenance advice. Please see specific information pulled into the AVS if appropriate.

## 2025-05-30 NOTE — ASSESSMENT & PLAN NOTE
Hypertension is uncontrolled  Medication changes per orders.  Dietary sodium restriction.  Regular aerobic exercise.  Ambulatory blood pressure monitoring.  Blood pressure will be reassessedat the next regular appointment.

## 2025-06-10 ENCOUNTER — PATIENT MESSAGE (OUTPATIENT)
Dept: FAMILY MEDICINE CLINIC | Facility: CLINIC | Age: 70
End: 2025-06-10
Payer: MEDICARE

## 2025-06-29 ENCOUNTER — PATIENT MESSAGE (OUTPATIENT)
Dept: FAMILY MEDICINE CLINIC | Facility: CLINIC | Age: 70
End: 2025-06-29
Payer: MEDICARE

## 2025-06-30 RX ORDER — LOSARTAN POTASSIUM AND HYDROCHLOROTHIAZIDE 25; 100 MG/1; MG/1
1 TABLET ORAL EVERY MORNING
Qty: 90 TABLET | Refills: 1 | Status: SHIPPED | OUTPATIENT
Start: 2025-06-30

## 2025-07-14 RX ORDER — MELOXICAM 15 MG/1
15 TABLET ORAL DAILY
Qty: 90 TABLET | Refills: 1 | Status: SHIPPED | OUTPATIENT
Start: 2025-07-14

## (undated) DEVICE — SINGLE-USE BIOPSY FORCEPS: Brand: RADIAL JAW 4

## (undated) DEVICE — CANN O2 ETCO2 FITS ALL CONN CO2 SMPL A/ 7IN DISP LF

## (undated) DEVICE — ADAPT CLN BIOGUARD AIR/H2O DISP

## (undated) DEVICE — SENSR O2 OXIMAX FNGR A/ 18IN NONSTR

## (undated) DEVICE — TUBING, SUCTION, 1/4" X 10', STRAIGHT: Brand: MEDLINE

## (undated) DEVICE — KT ORCA ORCAPOD DISP STRL